# Patient Record
Sex: MALE | Race: WHITE | NOT HISPANIC OR LATINO | Employment: FULL TIME | URBAN - METROPOLITAN AREA
[De-identification: names, ages, dates, MRNs, and addresses within clinical notes are randomized per-mention and may not be internally consistent; named-entity substitution may affect disease eponyms.]

---

## 2021-02-10 ENCOUNTER — TELEPHONE (OUTPATIENT)
Dept: SCHEDULING | Facility: IMAGING CENTER | Age: 31
End: 2021-02-10

## 2021-03-05 ENCOUNTER — OFFICE VISIT (OUTPATIENT)
Dept: MEDICAL GROUP | Facility: MEDICAL CENTER | Age: 31
End: 2021-03-05
Payer: COMMERCIAL

## 2021-03-05 VITALS
HEIGHT: 72 IN | WEIGHT: 195.33 LBS | DIASTOLIC BLOOD PRESSURE: 80 MMHG | RESPIRATION RATE: 18 BRPM | SYSTOLIC BLOOD PRESSURE: 118 MMHG | BODY MASS INDEX: 26.46 KG/M2 | TEMPERATURE: 98.9 F | OXYGEN SATURATION: 95 % | HEART RATE: 81 BPM

## 2021-03-05 DIAGNOSIS — R00.2 PALPITATIONS: ICD-10-CM

## 2021-03-05 DIAGNOSIS — F41.8 SITUATIONAL ANXIETY: ICD-10-CM

## 2021-03-05 DIAGNOSIS — Z76.89 ENCOUNTER TO ESTABLISH CARE: ICD-10-CM

## 2021-03-05 PROBLEM — F51.04 CHRONIC INSOMNIA: Status: ACTIVE | Noted: 2021-03-05

## 2021-03-05 PROBLEM — F51.04 CHRONIC INSOMNIA: Status: RESOLVED | Noted: 2021-03-05 | Resolved: 2021-03-05

## 2021-03-05 PROCEDURE — 99203 OFFICE O/P NEW LOW 30 MIN: CPT | Performed by: NURSE PRACTITIONER

## 2021-03-05 RX ORDER — ALPRAZOLAM 0.5 MG/1
0.5 TABLET ORAL NIGHTLY PRN
COMMUNITY
End: 2021-03-05 | Stop reason: SDUPTHER

## 2021-03-05 RX ORDER — ALPRAZOLAM 0.5 MG/1
0.5 TABLET ORAL
Qty: 30 TABLET | Refills: 0 | Status: SHIPPED | OUTPATIENT
Start: 2021-03-05 | End: 2021-04-04

## 2021-03-05 ASSESSMENT — PATIENT HEALTH QUESTIONNAIRE - PHQ9: CLINICAL INTERPRETATION OF PHQ2 SCORE: 0

## 2021-03-05 NOTE — PROGRESS NOTES
"Subjective:     Chief Complaint   Patient presents with   • New Patient     refill meds      Amparo Kline is a 30 y.o. male here today to establish care and discuss below concerns.  He is , no children    Palpitations  Starting as teenager. Has had EKG and holter in the past with no arrhythmia, believes that it showed PVCs. Triggered by heavy carb meals and caffeine which he avoids.  No h/o syncope, dizziness, shortness of breath, chest pain, anemia    Situational anxiety  Patient reports history of occasional anxiety typically exacerbated by travel, airplane plane flights.  He is used alprazolam on very sparing occasions and is requesting refill today.  30 tablets generally last him several months.  He does not drive or drink alcohol with medication.  No history of substance abuse.   report reviewed, no prescription history available in [place].  Patient has recently relocated from [place]       Current medicines (including changes today)  Current Outpatient Medications   Medication Sig Dispense Refill   • ALPRAZolam (XANAX) 0.5 MG Tab Take 1 tablet by mouth 1 time a day as needed for Sleep for up to 30 days. 30 tablet 0     No current facility-administered medications for this visit.     He  has no past medical history on file.    ROS included above     Objective:     /80 (BP Location: Right arm, Patient Position: Sitting, BP Cuff Size: Adult)   Pulse 81   Temp 37.2 °C (98.9 °F) (Temporal)   Resp 18   Ht 1.816 m (5' 11.5\")   Wt 88.6 kg (195 lb 5.2 oz)   SpO2 95%  Body mass index is 26.86 kg/m².     Physical Exam:  General: Alert, oriented in no acute distress.  Eye contact is good, speech is normal, affect calm  HEENT:  TMs gray with good landmarks bilaterally. No lymphadenopathy.  Lungs: clear to auscultation bilaterally, normal effort, no wheeze/ rhonchi/ rales.  CV: regular rate and rhythm, S1, S2, no murmur  Abdomen: soft, nontender, no hepatosplenomegaly  Ext: no edema, color normal, " vascularity normal, temperature normal    Assessment and Plan:   The following treatment plan was discussed   1. Palpitations   longstanding history of palpitations triggered by caffeine or high carb meals which he avoids.  He has had prior evaluation including Holter study which showed no arrhythmia.  No history of chest pain, syncope, shortness of breath, anemia.  No treatment needed at this time   2. Situational anxiety   occasional episodes of anxiety typically triggered by airplane travel or long distance car rides.  He has managed with very sparing use of alprazolam, he does not drive or drink alcohol with this medication (wife typically drives).  I will provide renewal for him today and expect that this will last several months.  He will need appointment for any future refill  ALPRAZolam (XANAX) 0.5 MG Tab   3. Encounter to establish care         Followup: Annually, sooner as needed         Please note that this dictation was created using voice recognition software. I have worked with consultants from the vendor as well as technical experts from [facility] to optimize the interface. I have made every reasonable attempt to correct obvious errors, but I expect that there are errors of grammar and possibly content that I did not discover before finalizing the note.

## 2021-03-05 NOTE — ASSESSMENT & PLAN NOTE
Starting as teenager. Has had EKG and holter in the past with no arrhythmia, believes that it showed PVCs. Triggered by heavy carb meals and caffeine which he avoids.  No h/o syncope, dizziness, shortness of breath, chest pain, anemia

## 2021-03-05 NOTE — ASSESSMENT & PLAN NOTE
Patient reports history of occasional anxiety typically exacerbated by travel, airplane plane flights.  He is used alprazolam on very sparing occasions and is requesting refill today.  30 tablets generally last him several months.  He does not drive or drink alcohol with medication.  No history of substance abuse.   report reviewed, no prescription history available in [place].  Patient has recently relocated from [place]

## 2021-05-19 ENCOUNTER — HOSPITAL ENCOUNTER (EMERGENCY)
Facility: MEDICAL CENTER | Age: 31
End: 2021-05-19
Attending: EMERGENCY MEDICINE
Payer: COMMERCIAL

## 2021-05-19 VITALS
HEIGHT: 71 IN | OXYGEN SATURATION: 96 % | RESPIRATION RATE: 20 BRPM | SYSTOLIC BLOOD PRESSURE: 147 MMHG | HEART RATE: 79 BPM | DIASTOLIC BLOOD PRESSURE: 88 MMHG | BODY MASS INDEX: 26.2 KG/M2 | WEIGHT: 187.17 LBS | TEMPERATURE: 97.8 F

## 2021-05-19 DIAGNOSIS — R14.0 BLOATING: ICD-10-CM

## 2021-05-19 DIAGNOSIS — I49.1 PAC (PREMATURE ATRIAL CONTRACTION): ICD-10-CM

## 2021-05-19 LAB
ALBUMIN SERPL BCP-MCNC: 4.6 G/DL (ref 3.2–4.9)
ALBUMIN/GLOB SERPL: 1.5 G/DL
ALP SERPL-CCNC: 98 U/L (ref 30–99)
ALT SERPL-CCNC: 17 U/L (ref 2–50)
ANION GAP SERPL CALC-SCNC: 11 MMOL/L (ref 7–16)
APPEARANCE UR: CLEAR
AST SERPL-CCNC: 16 U/L (ref 12–45)
BASOPHILS # BLD AUTO: 0.4 % (ref 0–1.8)
BASOPHILS # BLD: 0.03 K/UL (ref 0–0.12)
BILIRUB SERPL-MCNC: 2.2 MG/DL (ref 0.1–1.5)
BILIRUB UR QL STRIP.AUTO: NEGATIVE
BUN SERPL-MCNC: 8 MG/DL (ref 8–22)
CALCIUM SERPL-MCNC: 9.9 MG/DL (ref 8.5–10.5)
CHLORIDE SERPL-SCNC: 105 MMOL/L (ref 96–112)
CO2 SERPL-SCNC: 24 MMOL/L (ref 20–33)
COLOR UR: YELLOW
CREAT SERPL-MCNC: 0.83 MG/DL (ref 0.5–1.4)
EKG IMPRESSION: NORMAL
EOSINOPHIL # BLD AUTO: 0.08 K/UL (ref 0–0.51)
EOSINOPHIL NFR BLD: 1.2 % (ref 0–6.9)
ERYTHROCYTE [DISTWIDTH] IN BLOOD BY AUTOMATED COUNT: 41.6 FL (ref 35.9–50)
GLOBULIN SER CALC-MCNC: 3.1 G/DL (ref 1.9–3.5)
GLUCOSE SERPL-MCNC: 93 MG/DL (ref 65–99)
GLUCOSE UR STRIP.AUTO-MCNC: NEGATIVE MG/DL
HCT VFR BLD AUTO: 48.9 % (ref 42–52)
HGB BLD-MCNC: 16.8 G/DL (ref 14–18)
IMM GRANULOCYTES # BLD AUTO: 0.02 K/UL (ref 0–0.11)
IMM GRANULOCYTES NFR BLD AUTO: 0.3 % (ref 0–0.9)
KETONES UR STRIP.AUTO-MCNC: NEGATIVE MG/DL
LEUKOCYTE ESTERASE UR QL STRIP.AUTO: NEGATIVE
LIPASE SERPL-CCNC: 32 U/L (ref 11–82)
LYMPHOCYTES # BLD AUTO: 1.07 K/UL (ref 1–4.8)
LYMPHOCYTES NFR BLD: 15.9 % (ref 22–41)
MCH RBC QN AUTO: 31.7 PG (ref 27–33)
MCHC RBC AUTO-ENTMCNC: 34.4 G/DL (ref 33.7–35.3)
MCV RBC AUTO: 92.3 FL (ref 81.4–97.8)
MICRO URNS: NORMAL
MONOCYTES # BLD AUTO: 0.52 K/UL (ref 0–0.85)
MONOCYTES NFR BLD AUTO: 7.7 % (ref 0–13.4)
NEUTROPHILS # BLD AUTO: 5 K/UL (ref 1.82–7.42)
NEUTROPHILS NFR BLD: 74.5 % (ref 44–72)
NITRITE UR QL STRIP.AUTO: NEGATIVE
NRBC # BLD AUTO: 0 K/UL
NRBC BLD-RTO: 0 /100 WBC
PH UR STRIP.AUTO: 6.5 [PH] (ref 5–8)
PLATELET # BLD AUTO: 238 K/UL (ref 164–446)
PMV BLD AUTO: 11.3 FL (ref 9–12.9)
POTASSIUM SERPL-SCNC: 3.9 MMOL/L (ref 3.6–5.5)
PROT SERPL-MCNC: 7.7 G/DL (ref 6–8.2)
PROT UR QL STRIP: NEGATIVE MG/DL
RBC # BLD AUTO: 5.3 M/UL (ref 4.7–6.1)
RBC UR QL AUTO: NEGATIVE
SODIUM SERPL-SCNC: 140 MMOL/L (ref 135–145)
SP GR UR STRIP.AUTO: 1
TROPONIN T SERPL-MCNC: 7 NG/L (ref 6–19)
UROBILINOGEN UR STRIP.AUTO-MCNC: 0.2 MG/DL
WBC # BLD AUTO: 6.7 K/UL (ref 4.8–10.8)

## 2021-05-19 PROCEDURE — 93005 ELECTROCARDIOGRAM TRACING: CPT | Performed by: EMERGENCY MEDICINE

## 2021-05-19 PROCEDURE — 81003 URINALYSIS AUTO W/O SCOPE: CPT

## 2021-05-19 PROCEDURE — 80053 COMPREHEN METABOLIC PANEL: CPT

## 2021-05-19 PROCEDURE — 83690 ASSAY OF LIPASE: CPT

## 2021-05-19 PROCEDURE — 84484 ASSAY OF TROPONIN QUANT: CPT

## 2021-05-19 PROCEDURE — 99284 EMERGENCY DEPT VISIT MOD MDM: CPT

## 2021-05-19 PROCEDURE — A9270 NON-COVERED ITEM OR SERVICE: HCPCS | Performed by: EMERGENCY MEDICINE

## 2021-05-19 PROCEDURE — 85025 COMPLETE CBC W/AUTO DIFF WBC: CPT

## 2021-05-19 PROCEDURE — 93005 ELECTROCARDIOGRAM TRACING: CPT

## 2021-05-19 PROCEDURE — 700102 HCHG RX REV CODE 250 W/ 637 OVERRIDE(OP): Performed by: EMERGENCY MEDICINE

## 2021-05-19 RX ADMIN — LIDOCAINE HYDROCHLORIDE 30 ML: 20 SOLUTION OROPHARYNGEAL at 09:04

## 2021-05-19 NOTE — ED TRIAGE NOTES
"Chief Complaint   Patient presents with   • Palpitations     pt states heart palpitationsx1 hour. pt states usually they go away after 10 minutes. pt denies SOB   • Abdominal Pain     pt states LUQ started 1 hour ago when the palpitations started. pt denies any N/V       Pt walk in this morning for above complaint. Pt aox4, GCS 15. Pt has hx of gastroenteritis and takes prilosec PRN. EKG and protocol ordered. Educated pt on triage process and to notify if there is any change      /96   Pulse 80   Temp 36.2 °C (97.2 °F) (Temporal)   Resp 16   Ht 1.803 m (5' 11\")   Wt 84.9 kg (187 lb 2.7 oz)   SpO2 98%   BMI 26.11 kg/m²     "

## 2021-05-19 NOTE — ED PROVIDER NOTES
ED Provider Note    CHIEF COMPLAINT  Chief Complaint   Patient presents with   • Palpitations     pt states heart palpitationsx1 hour. pt states usually they go away after 10 minutes. pt denies SOB   • Abdominal Pain     pt states LUQ started 1 hour ago when the palpitations started. pt denies any N/V       HPI  Amparo Kline is a 30 y.o. male who presents to the emergency room with chief complaint of palpitations. He describes that this happens to him on a fairly regular basis, but usually episodes don't last this long. He tends to have palpitations after he develops abdominal bloating. He just feels like his stomach is filled and has some discomfort in the left upper abdomen. In fact he pushes on his left upper quadrant and you can hear fluid movement. He has not had any nausea or vomiting. He is had normal bowel movements. Because the palpitations did not go away like they normally do he presents to the ER. He has not had any dizziness or lightheadedness. No chest pain, shortness of breath, pleuritic symptoms. No leg swelling or leg pain. Denies alcohol use. No marijuana products. No stimulants. No caffeine.    REVIEW OF SYSTEMS  As per HPI, otherwise a 10 point review of systems is negative    PAST MEDICAL HISTORY  palpitations    SOCIAL HISTORY  Social History     Tobacco Use   • Smoking status: Light Tobacco Smoker   • Smokeless tobacco: Never Used   • Tobacco comment: occ cigar   Substance Use Topics   • Alcohol use: Yes     Comment: 1-2 drinks on weekends   • Drug use: Never       SURGICAL HISTORY  History reviewed. No pertinent surgical history.    CURRENT MEDICATIONS  Home Medications     Reviewed by Lakeisha Gresham R.N. (Registered Nurse) on 05/19/21 at 0718  Med List Status: Complete   Medication Last Dose Status        Patient Odell Taking any Medications                       ALLERGIES  No Known Allergies    PHYSICAL EXAM  VITAL SIGNS: /83   Pulse 82   Temp 36.2 °C (97.2 °F) (Temporal)   Resp 20   " Ht 1.803 m (5' 11\")   Wt 84.9 kg (187 lb 2.7 oz)   SpO2 92%   BMI 26.11 kg/m²    Constitutional: Awake and alert  HENT: Normal inspection  Eyes: Normal inspection  Neck: Grossly normal range of motion.  Cardiovascular: Normal heart rate, Normal rhythm.  Symmetric peripheral pulses.   Thorax & Lungs: No respiratory distress, No wheezing, No rales, No rhonchi, No chest tenderness.   Abdomen: Bowel sounds normal, soft, non-distended, nontender, no mass  Skin: No obvious rash.  Back: No tenderness, No CVA tenderness.   Extremities: No clubbing, cyanosis, edema, no Homans or cords.  Neurologic: Grossly normal   Psychiatric: Normal for situation      Labs:  Results for orders placed or performed during the hospital encounter of 05/19/21   CBC WITH DIFFERENTIAL   Result Value Ref Range    WBC 6.7 4.8 - 10.8 K/uL    RBC 5.30 4.70 - 6.10 M/uL    Hemoglobin 16.8 14.0 - 18.0 g/dL    Hematocrit 48.9 42.0 - 52.0 %    MCV 92.3 81.4 - 97.8 fL    MCH 31.7 27.0 - 33.0 pg    MCHC 34.4 33.7 - 35.3 g/dL    RDW 41.6 35.9 - 50.0 fL    Platelet Count 238 164 - 446 K/uL    MPV 11.3 9.0 - 12.9 fL    Neutrophils-Polys 74.50 (H) 44.00 - 72.00 %    Lymphocytes 15.90 (L) 22.00 - 41.00 %    Monocytes 7.70 0.00 - 13.40 %    Eosinophils 1.20 0.00 - 6.90 %    Basophils 0.40 0.00 - 1.80 %    Immature Granulocytes 0.30 0.00 - 0.90 %    Nucleated RBC 0.00 /100 WBC    Neutrophils (Absolute) 5.00 1.82 - 7.42 K/uL    Lymphs (Absolute) 1.07 1.00 - 4.80 K/uL    Monos (Absolute) 0.52 0.00 - 0.85 K/uL    Eos (Absolute) 0.08 0.00 - 0.51 K/uL    Baso (Absolute) 0.03 0.00 - 0.12 K/uL    Immature Granulocytes (abs) 0.02 0.00 - 0.11 K/uL    NRBC (Absolute) 0.00 K/uL   COMP METABOLIC PANEL   Result Value Ref Range    Sodium 140 135 - 145 mmol/L    Potassium 3.9 3.6 - 5.5 mmol/L    Chloride 105 96 - 112 mmol/L    Co2 24 20 - 33 mmol/L    Anion Gap 11.0 7.0 - 16.0    Glucose 93 65 - 99 mg/dL    Bun 8 8 - 22 mg/dL    Creatinine 0.83 0.50 - 1.40 mg/dL    Calcium " 9.9 8.5 - 10.5 mg/dL    AST(SGOT) 16 12 - 45 U/L    ALT(SGPT) 17 2 - 50 U/L    Alkaline Phosphatase 98 30 - 99 U/L    Total Bilirubin 2.2 (H) 0.1 - 1.5 mg/dL    Albumin 4.6 3.2 - 4.9 g/dL    Total Protein 7.7 6.0 - 8.2 g/dL    Globulin 3.1 1.9 - 3.5 g/dL    A-G Ratio 1.5 g/dL   LIPASE   Result Value Ref Range    Lipase 32 11 - 82 U/L   URINALYSIS    Specimen: Urine, Clean Catch   Result Value Ref Range    Color Yellow     Character Clear     Specific Gravity 1.004 <1.035    Ph 6.5 5.0 - 8.0    Glucose Negative Negative mg/dL    Ketones Negative Negative mg/dL    Protein Negative Negative mg/dL    Bilirubin Negative Negative    Urobilinogen, Urine 0.2 Negative    Nitrite Negative Negative    Leukocyte Esterase Negative Negative    Occult Blood Negative Negative    Micro Urine Req see below    ESTIMATED GFR   Result Value Ref Range    GFR If African American >60 >60 mL/min/1.73 m 2    GFR If Non African American >60 >60 mL/min/1.73 m 2   TROPONIN   Result Value Ref Range    Troponin T 7 6 - 19 ng/L   EKG   Result Value Ref Range    Report       [facility] Emergency Dept.    Test Date:  2021  Pt Name:    FRANCK ONEILL                    Department: ER  MRN:        [id number]                      Room:  Gender:     Male                         Technician: JORDANA  :        1990                   Requested By:ER TRIAGE PROTOCOL  Order #:    [id number]                    Reading MD: KIERSTEN LANTIGUA MD    Measurements  Intervals                                Axis  Rate:       96                           P:          33  NM:         174                          QRS:        27  QRSD:       97                           T:          27  QT:         336  QTc:        423    Interpretive Statements  Sinus rhythm  Atrial premature complexes  No previous ECG available for comparison  Electronically Signed On 2021 10:06:00 PDT by KIERSTEN LANTIGUA MD         Medications   hyoscyamine-maalox plus-lidocaine  viscous (GI COCKTAIL) oral susp 30 mL (30 mL Oral Given 5/19/21 0904)       COURSE & MEDICAL DECISION MAKING  patient presents with palpitations. He has premature atrial contractions. I explained to the patient that these were benign. Interestingly he has these associated with stomach distention and he feels bloated. I obtained laboratory data and the patient was given a G.I. cocktail.    No significant change in symptoms with G.I. cocktail.    Laboratory data returns showing neutral predominance which is nonspecific. Bilirubin is 2.2, but otherwise LFTs are normal. Troponin is normal. Electrolytes normal.    On further interview with the patient he reports that generally when the bloating occur he drinks impinges he has a bowel movement and his symptoms resolved. He states he will proceed with this. I advised that he take a daily antacid. He may benefit from gastric emptying study or perhaps upper endoscopy since the symptoms recur. Consider G.I. referral if persistent palpitation/PACs. He will follow-up with his primary doctor for now. I precaution him to return to the ER for Demario abdominal pain, pain in the right lower right upper quadrant, fevers or concern.    FINAL IMPRESSION  1. Premature atrial contractions.  2. Abdominal bloating, unspecified      This dictation was created using voice recognition software. The accuracy of the dictation is limited to the abilities of the software.  The nursing notes were reviewed and certain aspects of this information were incorporated into this note.      Electronically signed by: Star Mclain M.D., 5/19/2021 10:04 AM

## 2021-08-16 ENCOUNTER — OFFICE VISIT (OUTPATIENT)
Dept: MEDICAL GROUP | Facility: MEDICAL CENTER | Age: 31
End: 2021-08-16
Payer: COMMERCIAL

## 2021-08-16 VITALS
HEIGHT: 71 IN | WEIGHT: 189.6 LBS | BODY MASS INDEX: 26.54 KG/M2 | DIASTOLIC BLOOD PRESSURE: 70 MMHG | RESPIRATION RATE: 14 BRPM | TEMPERATURE: 97.1 F | SYSTOLIC BLOOD PRESSURE: 98 MMHG | OXYGEN SATURATION: 98 % | HEART RATE: 87 BPM

## 2021-08-16 DIAGNOSIS — F41.8 SITUATIONAL ANXIETY: ICD-10-CM

## 2021-08-16 DIAGNOSIS — Z00.00 ANNUAL PHYSICAL EXAM: ICD-10-CM

## 2021-08-16 PROCEDURE — 99395 PREV VISIT EST AGE 18-39: CPT | Performed by: NURSE PRACTITIONER

## 2021-08-16 RX ORDER — ALPRAZOLAM 0.5 MG/1
0.5 TABLET ORAL
COMMUNITY
End: 2021-08-16 | Stop reason: SDUPTHER

## 2021-08-16 RX ORDER — ALPRAZOLAM 0.5 MG/1
0.5 TABLET ORAL
Qty: 30 TABLET | Refills: 0 | Status: SHIPPED | OUTPATIENT
Start: 2021-08-16 | End: 2021-09-15

## 2021-08-16 ASSESSMENT — FIBROSIS 4 INDEX: FIB4 SCORE: 0.47

## 2021-08-16 NOTE — PROGRESS NOTES
"Subjective:     Chief Complaint   Patient presents with   • Annual Exam   • Medication Refill     Xanax     Amparo Kline is a 30 y.o. male here for annual and medication refill    Situational anxiety  Traveling frequently for work, tends to be very anxious with airplane travel.  Using alprazolam as needed, requesting refill today.   report reviewed.  She does not drive or drink alcohol with medication       Current medicines (including changes today)  Current Outpatient Medications   Medication Sig Dispense Refill   • ALPRAZolam (XANAX) 0.5 MG Tab Take 0.5 mg by mouth 1 time a day as needed for Sleep.       No current facility-administered medications for this visit.     He  has no past medical history on file.    ROS included above     Objective:     BP (!) 98/70 (BP Location: Left arm, Patient Position: Sitting, BP Cuff Size: Adult)   Pulse 87   Temp 36.2 °C (97.1 °F) (Temporal)   Resp 14   Ht 1.81 m (5' 11.26\")   Wt 86 kg (189 lb 9.5 oz)   SpO2 98%  Body mass index is 26.25 kg/m².     Physical Exam:  General: Alert, oriented in no acute distress.  Eye contact is good, speech is normal, affect calm  HEENT:  TMs gray with good landmarks bilaterally. No lymphadenopathy.  Lungs: clear to auscultation bilaterally, normal effort, no wheeze/ rhonchi/ rales.  CV: regular rate and rhythm, S1, S2, no murmur  Abdomen: soft, nontender  Ext: no edema, color normal, vascularity normal, temperature normal    Assessment and Plan:   The following treatment plan was discussed  1. Annual physical exam  Normal physical exam. General health and wellness discussion including healthy diet, regular exercise. Advised regular dental cleanings, eye exam yearly.  COVID-19 vaccine recommended     2. Situational anxiety   occasionally using alprazolam for airplane travel, medication refill provided today.   report reviewed, I determined this prescription to be medically necessary.  Risks discussed       Followup: annually     "     Please note that this dictation was created using voice recognition software. I have worked with consultants from the vendor as well as technical experts from [facility] to optimize the interface. I have made every reasonable attempt to correct obvious errors, but I expect that there are errors of grammar and possibly content that I did not discover before finalizing the note.

## 2021-08-16 NOTE — ASSESSMENT & PLAN NOTE
Traveling frequently for work, tends to be very anxious with airplane travel.  Using alprazolam as needed, requesting refill today.   report reviewed.  She does not drive or drink alcohol with medication

## 2021-12-03 ENCOUNTER — OFFICE VISIT (OUTPATIENT)
Dept: MEDICAL GROUP | Facility: MEDICAL CENTER | Age: 31
End: 2021-12-03
Payer: COMMERCIAL

## 2021-12-03 VITALS
DIASTOLIC BLOOD PRESSURE: 82 MMHG | HEIGHT: 71 IN | HEART RATE: 75 BPM | SYSTOLIC BLOOD PRESSURE: 114 MMHG | OXYGEN SATURATION: 97 % | BODY MASS INDEX: 27.93 KG/M2 | RESPIRATION RATE: 19 BRPM | TEMPERATURE: 98.2 F | WEIGHT: 199.52 LBS

## 2021-12-03 DIAGNOSIS — Z76.0 MEDICATION REFILL: ICD-10-CM

## 2021-12-03 DIAGNOSIS — F41.8 SITUATIONAL ANXIETY: ICD-10-CM

## 2021-12-03 PROCEDURE — 99213 OFFICE O/P EST LOW 20 MIN: CPT | Performed by: NURSE PRACTITIONER

## 2021-12-03 RX ORDER — ALPRAZOLAM 0.5 MG/1
0.5 TABLET ORAL 3 TIMES DAILY PRN
Qty: 30 TABLET | Refills: 0 | Status: SHIPPED | OUTPATIENT
Start: 2021-12-03 | End: 2022-01-02

## 2021-12-03 ASSESSMENT — FIBROSIS 4 INDEX: FIB4 SCORE: 0.49

## 2021-12-03 NOTE — ASSESSMENT & PLAN NOTE
Continues to have anxiety with travel or being away from home.  Travels frequently for work, requesting refill of alprazolam which she has used on sparing occasions with good results.  He does not drive or drink alcohol with medication.   report reviewed, no aberrant behavior, no opiate use.  Does not feel that he has trouble with anxiety on a daily basis

## 2022-02-07 ENCOUNTER — OFFICE VISIT (OUTPATIENT)
Dept: MEDICAL GROUP | Facility: MEDICAL CENTER | Age: 32
End: 2022-02-07
Payer: COMMERCIAL

## 2022-02-07 VITALS
OXYGEN SATURATION: 96 % | HEART RATE: 73 BPM | SYSTOLIC BLOOD PRESSURE: 120 MMHG | WEIGHT: 204.15 LBS | RESPIRATION RATE: 18 BRPM | BODY MASS INDEX: 27.65 KG/M2 | HEIGHT: 72 IN | TEMPERATURE: 97.6 F | DIASTOLIC BLOOD PRESSURE: 82 MMHG

## 2022-02-07 DIAGNOSIS — F41.8 SITUATIONAL ANXIETY: ICD-10-CM

## 2022-02-07 DIAGNOSIS — Z79.899 CHRONIC USE OF BENZODIAZEPINE FOR THERAPEUTIC PURPOSE: ICD-10-CM

## 2022-02-07 PROCEDURE — 99214 OFFICE O/P EST MOD 30 MIN: CPT | Performed by: INTERNAL MEDICINE

## 2022-02-07 RX ORDER — ALPRAZOLAM 0.5 MG/1
0.5 TABLET ORAL NIGHTLY PRN
COMMUNITY
End: 2022-02-07

## 2022-02-07 RX ORDER — ALPRAZOLAM 0.5 MG/1
0.5 TABLET ORAL
Qty: 30 TABLET | Refills: 0 | Status: SHIPPED | OUTPATIENT
Start: 2022-02-07 | End: 2022-05-08

## 2022-02-07 ASSESSMENT — FIBROSIS 4 INDEX: FIB4 SCORE: 0.49

## 2022-02-07 ASSESSMENT — ANXIETY QUESTIONNAIRES
7. FEELING AFRAID AS IF SOMETHING AWFUL MIGHT HAPPEN: NOT AT ALL
GAD7 TOTAL SCORE: 0
5. BEING SO RESTLESS THAT IT IS HARD TO SIT STILL: NOT AT ALL
6. BECOMING EASILY ANNOYED OR IRRITABLE: NOT AT ALL
4. TROUBLE RELAXING: NOT AT ALL
1. FEELING NERVOUS, ANXIOUS, OR ON EDGE: NOT AT ALL
3. WORRYING TOO MUCH ABOUT DIFFERENT THINGS: NOT AT ALL
2. NOT BEING ABLE TO STOP OR CONTROL WORRYING: NOT AT ALL

## 2022-02-07 ASSESSMENT — ENCOUNTER SYMPTOMS
CHILLS: 0
COUGH: 0
FEVER: 0
PALPITATIONS: 0
HEARTBURN: 1
NAUSEA: 0
SORE THROAT: 0
DEPRESSION: 0
HEMOPTYSIS: 0

## 2022-02-07 ASSESSMENT — LIFESTYLE VARIABLES: SUBSTANCE_ABUSE: 0

## 2022-02-07 ASSESSMENT — PATIENT HEALTH QUESTIONNAIRE - PHQ9: CLINICAL INTERPRETATION OF PHQ2 SCORE: 0

## 2022-02-07 NOTE — ASSESSMENT & PLAN NOTE
Continue alprazolam 0.5 mg tablet as needed: Patient uses on average approximately 10 tablets/months.  Controlled substance agreement signed today, urine drug screen obtained today.  Controlled substance discussed with client. Client agrees to abide by controlled substance contract.

## 2022-02-07 NOTE — LETTER
[facility]  Romy Lzoa M.D.  [address]  [place] [address]  Fax: [phone]   Authorization for Release/Disclosure of   Protected Health Information   Name: FRANCK ONEILL : 1990 SSN: [id number]   Address: [address] [place] [address]  [place] [address] Phone:    [phone] (home)    I authorize the entity listed below to release/disclose the PHI below to:   [facility]/Romy Loza M.D. and Romy Loza M.D.   Provider or Entity Name:  Nicoel Chi MD   Address   [facility], [facility], [facility]  [address] [place] [address] Phone:      Fax:     Reason for request: continuity of care   Information to be released:    [  ] LAST COLONOSCOPY,  including any PATH REPORT and follow-up  [  ] LAST FIT/COLOGUARD RESULT [  ] LAST DEXA  [  ] LAST MAMMOGRAM  [  ] LAST PAP  [  ] LAST LABS [  ] RETINA EXAM REPORT  [  ] IMMUNIZATION RECORDS  [  ] Release all info      [  ] Check here and initial the line next to each item to release ALL health information INCLUDING  _____ Care and treatment for drug and / or alcohol abuse  _____ HIV testing, infection status, or AIDS  _____ Genetic Testing    DATES OF SERVICE OR TIME PERIOD TO BE DISCLOSED: _____________  I understand and acknowledge that:  * This Authorization may be revoked at any time by you in writing, except if your health information has already been used or disclosed.  * Your health information that will be used or disclosed as a result of you signing this authorization could be re-disclosed by the recipient. If this occurs, your re-disclosed health information may no longer be protected by State or Federal laws.  * You may refuse to sign this Authorization. Your refusal will not affect your ability to obtain treatment.  * This Authorization becomes effective upon signing and will  on (date) __________.      If no date is indicated, this Authorization will  one (1) year from the signature date.     Name: Amparo Eliud    Signature:   Date:     2/7/2022       PLEASE FAX REQUESTED RECORDS BACK TO: [phone]

## 2022-02-07 NOTE — PROGRESS NOTES
Subjective:     Chief Complaint   Patient presents with   • Establish Care   • Medication Refill      Diagnoses of Chronic use of benzodiazepine for therapeutic purpose and Situational anxiety were pertinent to this visit.    HISTORY OF THE PRESENT ILLNESS: Patient is a 31 y.o. male. This pleasant patient is here today to establish care and for Xanax refill. His prior PCP was prior [place] - Nicole Chi MD at  [place], [place]     Problem   Chronic Use of Benzodiazepine for Therapeutic Purpose    Controlled substance medication f/u:     The patient is currently taking controlled substance(s) for the following condition(s): Situational anxiety    The patient is taking the medication(s) as prescribed.     Current medication regimen:  - Medication name: Alprazolam  - Medication dose: 0.5 mg  - Medication frequency: Daily as needed  - Patient last took the above medication: more than 3 days ago.   - Date of last refill: 12/03/2021 for #30 tabs for  30 day supply. Refills in recent history (last 6 months) only filled by prescribers at our office, filled at the same pharmacy under the same insurance coverage.     The patient reports their symptoms are well controlled on their current medication regimen.  Adverse effects: none.    [place] PDMP checked today: yes  Patient has a controlled substances contract verified within the last 1 year:yes  Patient last had drug of abuse screening on the following date 02/07/2022 - pending   Patient last had confirmatory urine testing on the following date 02/07/2022  Patient has had a relevant physical exam pertaining to the above diagnosis in the last 6 months.Yes      Patient is a candidate for naloxone: .no This is considered in patients with history of overdose, history of substance abuse, concurrent opioid/benzodiazepine use, and/or >50MME/day.      We reviewed the risks/benefits of being on the above controlled substance(s). While taking this medication, patient will comply  with all rules as stipulated in the contract and follow up in the office at least every 3 months according to our office policy.       Situational Anxiety    This is a chronic problem, patient experiences bouts of anxiety while flying, he has to fly frequently for work.  He denies using illicit drugs, he never drinks and takes Xanax together.  PHQ-9 and SUSAN scores both are 0 today.  Patient denies suicidal thoughts or ideations.       Past Medical History:   Diagnosis Date   • Anxiety    • GERD (gastroesophageal reflux disease)     occasional     Past Surgical History:   Procedure Laterality Date   • ENDOSCOPY,UPPER GI SIMPLE PRMY      5 years ago     Family History   Problem Relation Age of Onset   • Hypertension Mother    • Cancer Mother         breast   • Diabetes Father    • Kidney Disease Father         dialysis, ESRD   • Hypertension Father    • Hyperlipidemia Father    • Cancer Maternal Aunt         breast     Social History     Tobacco Use   • Smoking status: Light Tobacco Smoker   • Smokeless tobacco: Never Used   • Tobacco comment: occ cigar   Vaping Use   • Vaping Use: Never used   Substance Use Topics   • Alcohol use: Yes     Comment: 3-5 a week    • Drug use: Never     Current Outpatient Medications Ordered in Epic   Medication Sig Dispense Refill   • ALPRAZolam (XANAX) 0.5 MG Tab Take 0.5 mg by mouth at bedtime as needed for Sleep.     • ALPRAZolam (XANAX) 0.5 MG Tab Take 1 Tablet by mouth 1 time a day as needed for Anxiety (situational anxiety) for up to 90 days. 30 Tablet 0     No current Epic-ordered facility-administered medications on file.     Health Maintenance: Vaccination records requested from PCP.    Review of Systems   Constitutional: Negative for chills and fever.   HENT: Negative for sore throat.    Respiratory: Negative for cough and hemoptysis.    Cardiovascular: Negative for chest pain and palpitations.   Gastrointestinal: Positive for heartburn (rare). Negative for nausea.  "  Genitourinary: Negative for dysuria.   Psychiatric/Behavioral: Negative for depression, substance abuse and suicidal ideas.     Objective:     Exam: /82 (BP Location: Left arm, Patient Position: Sitting, BP Cuff Size: Adult)   Pulse 73   Temp 36.4 °C (97.6 °F) (Temporal)   Resp 18   Ht 1.82 m (5' 11.65\")   Wt 92.6 kg (204 lb 2.3 oz)   SpO2 96%  Body mass index is 27.96 kg/m².    Physical Exam  Constitutional:       Appearance: Normal appearance.   HENT:      Head: Normocephalic and atraumatic.      Nose: Nose normal. No congestion.      Mouth/Throat:      Mouth: Mucous membranes are moist.      Pharynx: Oropharynx is clear. No oropharyngeal exudate.   Eyes:      General: No scleral icterus.  Cardiovascular:      Rate and Rhythm: Normal rate and regular rhythm.      Pulses: Normal pulses.      Heart sounds: Normal heart sounds. No murmur heard.  No gallop.    Pulmonary:      Effort: Pulmonary effort is normal. No respiratory distress.      Breath sounds: Normal breath sounds. No wheezing or rales.   Skin:     General: Skin is warm and dry.   Neurological:      General: No focal deficit present.      Mental Status: He is alert and oriented to person, place, and time.      Gait: Gait normal.   Psychiatric:         Mood and Affect: Mood normal.         Behavior: Behavior normal.         Thought Content: Thought content normal.         Judgment: Judgment normal.         Labs: Reviewed CBC, CMP, UA and troponin from May 19, 2021.    Assessment & Plan:   31 y.o. male with the following -    Problem List Items Addressed This Visit     Chronic use of benzodiazepine for therapeutic purpose     Continue alprazolam 0.5 mg tablet as needed: Patient uses on average approximately 10 tablets/months.  Controlled substance agreement signed today, urine drug screen obtained today.  Controlled substance discussed with client. Client agrees to abide by controlled substance contract.           Relevant Medications    " ALPRAZolam (XANAX) 0.5 MG Tab    Other Relevant Orders    Controlled Substance Treatment Agreement    Situational anxiety     This is a chronic problem, well-controlled with as needed Xanax.  PHQ-9 and SUSAN-7 scales almost 0 today.  Patient has no suicidal thoughts or ideations.         Relevant Medications    ALPRAZolam (XANAX) 0.5 MG Tab    ALPRAZolam (XANAX) 0.5 MG Tab          Return in about 3 months (around 5/7/2022) for med refill.    Please note that this dictation was created using voice recognition software. I have made every reasonable attempt to correct obvious errors, but I expect that there are errors of grammar and possibly content that I did not discover before finalizing the note.

## 2022-02-07 NOTE — ASSESSMENT & PLAN NOTE
This is a chronic problem, well-controlled with as needed Xanax.  PHQ-9 and SUSAN-7 scales almost 0 today.  Patient has no suicidal thoughts or ideations.

## 2022-04-14 NOTE — PROGRESS NOTES
"Subjective:     Chief Complaint   Patient presents with   • Medication Refill     ALPRAZolam (XANAX) 0.5 MG Tab     Amparo Kline is a 31 y.o. male here today to follow up on:    Situational anxiety  Continues to have anxiety with travel or being away from home.  Travels frequently for work, requesting refill of alprazolam which she has used on sparing occasions with good results.  He does not drive or drink alcohol with medication.   report reviewed, no aberrant behavior, no opiate use.  Does not feel that he has trouble with anxiety on a daily basis       Current medicines (including changes today)  Current Outpatient Medications   Medication Sig Dispense Refill   • ALPRAZolam (XANAX) 0.5 MG Tab Take 1 Tablet by mouth 3 times a day as needed for Anxiety for up to 30 days. 30 Tablet 0     No current facility-administered medications for this visit.     He  has no past medical history on file.    ROS included above     Objective:     /82 (BP Location: Left arm, Patient Position: Sitting, BP Cuff Size: Adult)   Pulse 75   Temp 36.8 °C (98.2 °F) (Temporal)   Resp 19   Ht 1.803 m (5' 11\")   Wt 90.5 kg (199 lb 8.3 oz)   SpO2 97%  Body mass index is 27.83 kg/m².     Physical Exam:  General: Alert, oriented in no acute distress.  Eye contact is good, speech is normal, affect calm.  Lungs: clear to auscultation bilaterally, normal effort, no wheeze/ rhonchi/ rales.  CV: regular rate and rhythm, S1, S2, no murmur  Ext: no edema, color normal, vascularity normal, temperature normal    Assessment and Plan:   The following treatment plan was discussed   1. Situational anxiety   anxiety primarily surrounding travel which she is required to do for work.  Additionally he has several trips coming up over the holidays.  I will renew alprazolam for occasional use.  No history of aberrant behavior.   report reviewed, risks discussed.  He is aware he should not drive or drink alcohol with medication  ALPRAZolam (XANAX) " 0.5 MG Tab   2. Medication refill         Followup: As needed         Please note that this dictation was created using voice recognition software. I have worked with consultants from the vendor as well as technical experts from [employer] to optimize the interface. I have made every reasonable attempt to correct obvious errors, but I expect that there are errors of grammar and possibly content that I did not discover before finalizing the note.        Minoxidil Pregnancy And Lactation Text: This medication has not been assigned a Pregnancy Risk Category but animal studies failed to show danger with the topical medication. It is unknown if the medication is excreted in breast milk.

## 2022-05-09 ENCOUNTER — APPOINTMENT (OUTPATIENT)
Dept: MEDICAL GROUP | Facility: MEDICAL CENTER | Age: 32
End: 2022-05-09
Payer: COMMERCIAL

## 2022-05-12 ENCOUNTER — OFFICE VISIT (OUTPATIENT)
Dept: MEDICAL GROUP | Facility: MEDICAL CENTER | Age: 32
End: 2022-05-12
Payer: COMMERCIAL

## 2022-05-12 VITALS
OXYGEN SATURATION: 95 % | HEART RATE: 77 BPM | BODY MASS INDEX: 29.57 KG/M2 | TEMPERATURE: 98.1 F | HEIGHT: 71 IN | SYSTOLIC BLOOD PRESSURE: 128 MMHG | WEIGHT: 211.2 LBS | DIASTOLIC BLOOD PRESSURE: 82 MMHG

## 2022-05-12 DIAGNOSIS — Z79.899 CHRONIC USE OF BENZODIAZEPINE FOR THERAPEUTIC PURPOSE: ICD-10-CM

## 2022-05-12 DIAGNOSIS — Z00.00 PREVENTATIVE HEALTH CARE: ICD-10-CM

## 2022-05-12 DIAGNOSIS — F41.8 SITUATIONAL ANXIETY: ICD-10-CM

## 2022-05-12 DIAGNOSIS — Z13.220 LIPID SCREENING: ICD-10-CM

## 2022-05-12 PROCEDURE — 99214 OFFICE O/P EST MOD 30 MIN: CPT | Performed by: INTERNAL MEDICINE

## 2022-05-12 RX ORDER — ALPRAZOLAM 0.5 MG/1
0.5 TABLET ORAL
Qty: 30 TABLET | Refills: 0 | Status: SHIPPED | OUTPATIENT
Start: 2022-05-12 | End: 2022-06-11

## 2022-05-12 ASSESSMENT — ENCOUNTER SYMPTOMS
CONSTITUTIONAL NEGATIVE: 1
CARDIOVASCULAR NEGATIVE: 1
RESPIRATORY NEGATIVE: 1
EYES NEGATIVE: 1
DEPRESSION: 0
GASTROINTESTINAL NEGATIVE: 1

## 2022-05-12 ASSESSMENT — LIFESTYLE VARIABLES: SUBSTANCE_ABUSE: 0

## 2022-05-12 ASSESSMENT — FIBROSIS 4 INDEX: FIB4 SCORE: 0.49

## 2022-05-13 NOTE — PROGRESS NOTES
Subjective:     Chief Complaint   Patient presents with   • Medication Refill   • Follow-Up     Diagnoses of Lipid screening, Preventative health care, Chronic use of benzodiazepine for therapeutic purpose, and Situational anxiety were pertinent to this visit.    HPI: Amparo is a pleasant 31 y.o. male who presents today for medication follow-up.    Problem   Situational Anxiety    This is a chronic problem, patient experiences bouts of anxiety while flying and traveling, he has to fly frequently for work.  He denies using illicit drugs, he never drinks and takes Xanax together.  He does not drive while taking Xanax.  PHQ-9 and SUSAN scores both are 0 today.  Patient denies suicidal thoughts or ideations.  PDMP reviewed today, last filled on February 9, 2022, #30 tablets.  30 tablets usually last patient up to 3 months.  Repeat pain screening obtained today, his previous was positive for alcohol.         Past Medical History:   Diagnosis Date   • Anxiety    • GERD (gastroesophageal reflux disease)     occasional     Past Surgical History:   Procedure Laterality Date   • ENDOSCOPY,UPPER GI SIMPLE PRMY      5 years ago     Family History   Problem Relation Age of Onset   • Hypertension Mother    • Cancer Mother         breast   • Diabetes Father    • Kidney Disease Father         dialysis, ESRD   • Hypertension Father    • Hyperlipidemia Father    • Cancer Maternal Aunt         breast     Social History     Tobacco Use   • Smoking status: Light Tobacco Smoker   • Smokeless tobacco: Never Used   • Tobacco comment: occ cigar   Vaping Use   • Vaping Use: Never used   Substance Use Topics   • Alcohol use: Yes     Comment: 3-5 a week    • Drug use: Never       Current Outpatient Medications Ordered in Epic   Medication Sig Dispense Refill   • ALPRAZolam (XANAX) 0.5 MG Tab Take 1 Tablet by mouth 1 time a day as needed for Anxiety for up to 30 days. 30 Tablet 0     No current Epic-ordered facility-administered medications on  "file.     Health Maintenance: patient will obtain his immunization records from his previous physician and upload to my chart.    Review of Systems   Constitutional: Negative.    HENT: Negative.    Eyes: Negative.    Respiratory: Negative.    Cardiovascular: Negative.    Gastrointestinal: Negative.    Skin: Negative.    Psychiatric/Behavioral: Negative for depression and substance abuse.     Objective:     Exam:  /82 (BP Location: Right arm, Patient Position: Sitting, BP Cuff Size: Adult)   Pulse 77   Temp 36.7 °C (98.1 °F) (Temporal)   Ht 1.803 m (5' 11\")   Wt 95.8 kg (211 lb 3.2 oz)   SpO2 95%   BMI 29.46 kg/m²  Body mass index is 29.46 kg/m².    Physical Exam  Constitutional:       Appearance: Normal appearance. He is normal weight.   HENT:      Head: Normocephalic and atraumatic.      Nose: Nose normal.      Mouth/Throat:      Mouth: Mucous membranes are moist.      Pharynx: Oropharynx is clear.   Eyes:      General: No scleral icterus.  Cardiovascular:      Rate and Rhythm: Normal rate and regular rhythm.      Pulses: Normal pulses.      Heart sounds: Normal heart sounds. No murmur heard.  Pulmonary:      Effort: Pulmonary effort is normal. No respiratory distress.      Breath sounds: Normal breath sounds. No wheezing or rales.   Skin:     General: Skin is warm and dry.   Neurological:      General: No focal deficit present.      Mental Status: He is alert and oriented to person, place, and time.   Psychiatric:         Mood and Affect: Mood normal.         Behavior: Behavior normal.         Thought Content: Thought content normal.         Judgment: Judgment normal.       Labs: reviewed pain management screening from 2/9/2022      Assessment & Plan:   Amparo  is a pleasant 31 y.o. male with the following -     Problem List Items Addressed This Visit     Chronic use of benzodiazepine for therapeutic purpose    Relevant Medications    ALPRAZolam (XANAX) 0.5 MG Tab    Other Relevant Orders    Pain " Management Screen    Situational anxiety     This is a chronic problem, controlled with Xanax 0.5 mg tablet as needed while flying or traveling for work.  Patient does not take Xanax and drive, does not mix alcohol with Xanax.  He has no suicidal thoughts or ideations.  PDMP reviewed, last filled in February 9, 2022, 30 tablets.  Patient does not take medication daily, 30 tablets generally last him about 3 months.  Patient is aware he needs in person appointment for further refills.  Repeat pain screening obtained today.           Relevant Medications    ALPRAZolam (XANAX) 0.5 MG Tab      Other Visit Diagnoses     Lipid screening        Relevant Orders    Lipid Profile    Preventative health care        Relevant Orders    CBC WITH DIFFERENTIAL    Comp Metabolic Panel        Return in about 3 months (around 8/12/2022), or if symptoms worsen or fail to improve.    Please note that this dictation was created using voice recognition software. I have made every reasonable attempt to correct obvious errors, but I expect that there are errors of grammar and possibly content that I did not discover before finalizing the note.

## 2022-05-13 NOTE — ASSESSMENT & PLAN NOTE
This is a chronic problem, controlled with Xanax 0.5 mg tablet as needed while flying or traveling for work.  Patient does not take Xanax and drive, does not mix alcohol with Xanax.  He has no suicidal thoughts or ideations.  PDMP reviewed, last filled in February 9, 2022, 30 tablets.  Patient does not take medication daily, 30 tablets generally last him about 3 months.  Patient is aware he needs in person appointment for further refills.  Repeat pain screening obtained today.

## 2022-06-30 ENCOUNTER — OFFICE VISIT (OUTPATIENT)
Dept: MEDICAL GROUP | Facility: MEDICAL CENTER | Age: 32
End: 2022-06-30
Payer: COMMERCIAL

## 2022-06-30 VITALS
HEIGHT: 71 IN | DIASTOLIC BLOOD PRESSURE: 70 MMHG | BODY MASS INDEX: 29.94 KG/M2 | SYSTOLIC BLOOD PRESSURE: 110 MMHG | TEMPERATURE: 97.8 F | HEART RATE: 96 BPM | OXYGEN SATURATION: 95 % | WEIGHT: 213.85 LBS

## 2022-06-30 DIAGNOSIS — F41.0 PANIC ATTACKS: ICD-10-CM

## 2022-06-30 DIAGNOSIS — F41.0 PANIC ATTACK: ICD-10-CM

## 2022-06-30 PROCEDURE — 99213 OFFICE O/P EST LOW 20 MIN: CPT | Performed by: INTERNAL MEDICINE

## 2022-06-30 RX ORDER — PROPRANOLOL HYDROCHLORIDE 10 MG/1
10 TABLET ORAL 3 TIMES DAILY PRN
Qty: 90 TABLET | Refills: 1 | Status: SHIPPED | OUTPATIENT
Start: 2022-06-30 | End: 2023-01-23 | Stop reason: SDUPTHER

## 2022-06-30 ASSESSMENT — ENCOUNTER SYMPTOMS
DEPRESSION: 0
CONSTITUTIONAL NEGATIVE: 1
GASTROINTESTINAL NEGATIVE: 1
NERVOUS/ANXIOUS: 0
CARDIOVASCULAR NEGATIVE: 1
EYES NEGATIVE: 1
RESPIRATORY NEGATIVE: 1

## 2022-06-30 ASSESSMENT — FIBROSIS 4 INDEX: FIB4 SCORE: 0.49

## 2022-06-30 ASSESSMENT — LIFESTYLE VARIABLES: SUBSTANCE_ABUSE: 0

## 2022-06-30 NOTE — PROGRESS NOTES
Subjective:     Chief Complaint   Patient presents with   • Anxiety     Diagnoses of Panic attack and Panic attacks were pertinent to this visit.    HPI: Amparo is a pleasant 31 y.o. male who presents today with his wife Lynnette     Problem   Panic Attacks    This is a chronic problem, patient has weeks where he would have no episodes of anxiety or panic attacks but will, some weeks he would have them 2-3 times a week.  He is using Xanax as needed for those episodes. He is interested in trying alternative today as he is aware of addictive nature of Xanax and wide spectrum of side effects.  Discussed available SSRIs, SNRIs, as needed beta-blockers.  Patient opted for trial of beta-blockers as needed.         Past Medical History:   Diagnosis Date   • Anxiety    • GERD (gastroesophageal reflux disease)     occasional     Past Surgical History:   Procedure Laterality Date   • ENDOSCOPY,UPPER GI SIMPLE PRMY      5 years ago     Family History   Problem Relation Age of Onset   • Hypertension Mother    • Cancer Mother         breast   • Diabetes Father    • Kidney Disease Father         dialysis, ESRD   • Hypertension Father    • Hyperlipidemia Father    • Cancer Maternal Aunt         breast     Social History     Tobacco Use   • Smoking status: Light Tobacco Smoker   • Smokeless tobacco: Never Used   • Tobacco comment: occ cigar   Vaping Use   • Vaping Use: Never used   Substance Use Topics   • Alcohol use: Yes     Comment: 3-5 a week    • Drug use: Never       Current Outpatient Medications Ordered in Epic   Medication Sig Dispense Refill   • propranolol (INDERAL) 10 MG Tab Take 1 Tablet by mouth 3 times a day as needed (panic attasks/anxiety). 90 Tablet 1     No current Epic-ordered facility-administered medications on file.     Health Maintenance: Reviewed.    Review of Systems   Constitutional: Negative.    HENT: Negative.    Eyes: Negative.    Respiratory: Negative.    Cardiovascular: Negative.    Gastrointestinal:  "Negative.    Skin: Negative.    Psychiatric/Behavioral: Negative for depression and substance abuse. The patient is not nervous/anxious.            Objective:     Exam:  /70 (BP Location: Left arm, Patient Position: Sitting, BP Cuff Size: Adult)   Pulse 96   Temp 36.6 °C (97.8 °F) (Temporal)   Ht 1.803 m (5' 11\")   Wt 97 kg (213 lb 13.5 oz)   SpO2 95%   BMI 29.83 kg/m²  Body mass index is 29.83 kg/m².    Physical Exam  Constitutional:       Appearance: Normal appearance. He is normal weight.   HENT:      Head: Normocephalic and atraumatic.   Eyes:      General: No scleral icterus.  Cardiovascular:      Rate and Rhythm: Normal rate and regular rhythm.      Pulses: Normal pulses.      Heart sounds: Normal heart sounds. No murmur heard.  Pulmonary:      Effort: Pulmonary effort is normal.      Breath sounds: Normal breath sounds. No rhonchi.   Skin:     General: Skin is warm and dry.   Neurological:      General: No focal deficit present.      Mental Status: He is alert and oriented to person, place, and time.   Psychiatric:         Mood and Affect: Mood normal.         Behavior: Behavior normal.         Thought Content: Thought content normal.         Judgment: Judgment normal.       Labs: No labs pertinent to the visit.    Assessment & Plan:   Amparo  is a pleasant 31 y.o. male with the following -     Problem List Items Addressed This Visit     Panic attacks     On average 1-3 times a week.  Some weeks patient is symptoms free.  Most of his anxiety and panic attacks are related to traveling: Driving/flying.  We have went over preventative treatment of SSRIs, SNRIs, beta-blockers as needed.  Patient opted for trial of beta-blockers as needed.  Patient is not interested in daily preventative treatment at this time.             Other Visit Diagnoses     Panic attack        Relevant Medications    propranolol (INDERAL) 10 MG Tab        Return if symptoms worsen or fail to improve.    Please note that this " dictation was created using voice recognition software. I have made every reasonable attempt to correct obvious errors, but I expect that there are errors of grammar and possibly content that I did not discover before finalizing the note.

## 2022-06-30 NOTE — ASSESSMENT & PLAN NOTE
On average 1-3 times a week.  Some weeks patient is symptoms free.  Most of his anxiety and panic attacks are related to traveling: Driving/flying.  We have went over preventative treatment of SSRIs, SNRIs, beta-blockers as needed.  Patient opted for trial of beta-blockers as needed.  Patient is not interested in daily preventative treatment at this time.

## 2022-08-15 ENCOUNTER — OFFICE VISIT (OUTPATIENT)
Dept: MEDICAL GROUP | Facility: MEDICAL CENTER | Age: 32
End: 2022-08-15
Payer: COMMERCIAL

## 2022-08-15 VITALS — BODY MASS INDEX: 30.38 KG/M2 | WEIGHT: 217.81 LBS

## 2022-08-15 DIAGNOSIS — Z79.899 CHRONIC USE OF BENZODIAZEPINE FOR THERAPEUTIC PURPOSE: ICD-10-CM

## 2022-08-15 DIAGNOSIS — F41.0 PANIC ATTACKS: ICD-10-CM

## 2022-08-15 PROCEDURE — 99214 OFFICE O/P EST MOD 30 MIN: CPT | Performed by: INTERNAL MEDICINE

## 2022-08-15 RX ORDER — ALPRAZOLAM 0.5 MG/1
0.5 TABLET ORAL NIGHTLY PRN
Qty: 30 TABLET | Refills: 0 | Status: SHIPPED | OUTPATIENT
Start: 2022-08-15 | End: 2022-09-14

## 2022-08-15 ASSESSMENT — FIBROSIS 4 INDEX: FIB4 SCORE: 0.49

## 2022-08-15 ASSESSMENT — ENCOUNTER SYMPTOMS
CONSTITUTIONAL NEGATIVE: 1
EYES NEGATIVE: 1
CARDIOVASCULAR NEGATIVE: 1
RESPIRATORY NEGATIVE: 1
DEPRESSION: 0
NERVOUS/ANXIOUS: 0
GASTROINTESTINAL NEGATIVE: 1

## 2022-08-15 ASSESSMENT — LIFESTYLE VARIABLES: SUBSTANCE_ABUSE: 0

## 2022-08-15 NOTE — PROGRESS NOTES
Subjective:     Chief Complaint   Patient presents with    Medication Refill     Diagnoses of Chronic use of benzodiazepine for therapeutic purpose and Panic attacks were pertinent to this visit.    HPI: Amparo is a pleasant 31 y.o. male who presents today for medication refill.    Problem   Panic Attacks    This is a chronic problem, 0-3 episodes per week.  He is using Xanax as needed for those episodes.  During his last appointment, I have prescribed patient propranolol to use as needed and patient finds it helpful. However still prefers using Xanax for flying and road trips.  He does not drive, when takes Xanax.  His wife always drives him.     Chronic Use of Benzodiazepine for Therapeutic Purpose    Controlled substance medication f/u:     The patient is currently taking controlled substance(s) for the following condition(s): Situational anxiety    The patient is taking the medication(s) as prescribed.  Current medication regimen:  - Medication name: Alprazolam  - Medication dose: 0.5 mg  - Medication frequency: Daily as needed  - Patient last took the above medication: more than 3 days ago.   - Date of last refill: 05/14/2022 for #30 tabs for  30 day supply. Refills in recent history (last 6 months) only filled by prescribers at our office, filled at the same pharmacy under the same insurance coverage.     The patient reports their symptoms are well controlled on their current medication regimen.  Adverse effects: none.  [place] PDMP checked today: yes  Patient has a controlled substances contract verified within the last 1 year:yes             Current Outpatient Medications Ordered in Epic   Medication Sig Dispense Refill    propranolol (INDERAL) 10 MG Tab Take 1 Tablet by mouth 3 times a day as needed (panic attasks/anxiety). 90 Tablet 1     No current Epic-ordered facility-administered medications on file.     Review of Systems   Constitutional: Negative.    HENT: Negative.     Eyes: Negative.    Respiratory:  "Negative.     Cardiovascular: Negative.    Gastrointestinal: Negative.    Skin: Negative.    Psychiatric/Behavioral:  Negative for depression and substance abuse. The patient is not nervous/anxious.      Objective:     Exam:  BP (P) 122/84 (BP Location: Left arm, Patient Position: Sitting, BP Cuff Size: Adult)   Pulse (P) 84   Temp (P) 36.9 °C (98.4 °F) (Temporal)   Ht (P) 1.803 m (5' 11\")   Wt 98.8 kg (217 lb 13 oz)   SpO2 (P) 96%   BMI (P) 30.38 kg/m²  Body mass index is 30.38 kg/m² (pended).    Physical Exam  Constitutional:       Appearance: Normal appearance. He is normal weight.   HENT:      Head: Normocephalic and atraumatic.   Cardiovascular:      Rate and Rhythm: Normal rate and regular rhythm.      Pulses: Normal pulses.      Heart sounds: Normal heart sounds. No murmur heard.  Pulmonary:      Effort: Pulmonary effort is normal. No respiratory distress.      Breath sounds: Normal breath sounds. No wheezing or rales.   Neurological:      General: No focal deficit present.      Mental Status: He is alert and oriented to person, place, and time.   Psychiatric:         Mood and Affect: Mood normal.         Behavior: Behavior normal.     Labs: pending completion    Assessment & Plan:   Amparo  is a pleasant 31 y.o. male with the following -     Problem List Items Addressed This Visit       Chronic use of benzodiazepine for therapeutic purpose (Chronic)     Details as above in HPI. PDMP reviewed today, no aberrant behavior.  Urine drug screen was normal limits. Continue alprazolam 0.5 mg tablet as needed.           Panic attacks (Chronic)     On average 0-3 attacks per weeks, most of the attacks, related to traveling, driving or flying. Patient is still not interested in the daily medicine, would rather use propranolol and Xanax as needed.            Return in about 3 months (around 11/15/2022), or if symptoms worsen or fail to improve.    Please note that this dictation was created using voice recognition " software. I have made every reasonable attempt to correct obvious errors, but I expect that there are errors of grammar and possibly content that I did not discover before finalizing the note.

## 2022-08-15 NOTE — ASSESSMENT & PLAN NOTE
Details as above in HPI. PDMP reviewed today, no aberrant behavior.  Urine drug screen was normal limits. Continue alprazolam 0.5 mg tablet as needed.

## 2022-08-15 NOTE — ASSESSMENT & PLAN NOTE
On average 0-3 attacks per weeks, most of the attacks, related to traveling, driving or flying. Patient is still not interested in the daily preventative medication, he would like to continue using propranolol and Xanax as needed.

## 2022-11-07 ENCOUNTER — APPOINTMENT (OUTPATIENT)
Dept: MEDICAL GROUP | Facility: MEDICAL CENTER | Age: 32
End: 2022-11-07
Payer: COMMERCIAL

## 2022-12-06 ENCOUNTER — OFFICE VISIT (OUTPATIENT)
Dept: MEDICAL GROUP | Facility: MEDICAL CENTER | Age: 32
End: 2022-12-06
Payer: COMMERCIAL

## 2022-12-06 VITALS
TEMPERATURE: 98.2 F | HEART RATE: 89 BPM | BODY MASS INDEX: 30.56 KG/M2 | OXYGEN SATURATION: 96 % | SYSTOLIC BLOOD PRESSURE: 118 MMHG | DIASTOLIC BLOOD PRESSURE: 78 MMHG | HEIGHT: 71 IN | WEIGHT: 218.26 LBS

## 2022-12-06 DIAGNOSIS — Z00.00 PREVENTATIVE HEALTH CARE: ICD-10-CM

## 2022-12-06 DIAGNOSIS — K21.9 GASTROESOPHAGEAL REFLUX DISEASE WITHOUT ESOPHAGITIS: ICD-10-CM

## 2022-12-06 DIAGNOSIS — Z13.220 ENCOUNTER FOR LIPID SCREENING FOR CARDIOVASCULAR DISEASE: ICD-10-CM

## 2022-12-06 DIAGNOSIS — Z79.899 CHRONIC USE OF BENZODIAZEPINE FOR THERAPEUTIC PURPOSE: Chronic | ICD-10-CM

## 2022-12-06 DIAGNOSIS — Z13.29 THYROID DISORDER SCREEN: ICD-10-CM

## 2022-12-06 DIAGNOSIS — F41.8 SITUATIONAL ANXIETY: ICD-10-CM

## 2022-12-06 DIAGNOSIS — Z13.6 ENCOUNTER FOR LIPID SCREENING FOR CARDIOVASCULAR DISEASE: ICD-10-CM

## 2022-12-06 PROCEDURE — 99214 OFFICE O/P EST MOD 30 MIN: CPT | Performed by: INTERNAL MEDICINE

## 2022-12-06 RX ORDER — ALPRAZOLAM 0.5 MG/1
0.5 TABLET ORAL NIGHTLY PRN
Qty: 30 TABLET | Refills: 0 | Status: SHIPPED | OUTPATIENT
Start: 2022-12-06 | End: 2023-01-05

## 2022-12-06 ASSESSMENT — LIFESTYLE VARIABLES: SUBSTANCE_ABUSE: 0

## 2022-12-06 ASSESSMENT — ENCOUNTER SYMPTOMS
DEPRESSION: 0
RESPIRATORY NEGATIVE: 1
HEARTBURN: 1
NERVOUS/ANXIOUS: 0
CARDIOVASCULAR NEGATIVE: 1
CONSTITUTIONAL NEGATIVE: 1
EYES NEGATIVE: 1

## 2022-12-06 ASSESSMENT — FIBROSIS 4 INDEX: FIB4 SCORE: 0.51

## 2022-12-06 NOTE — ASSESSMENT & PLAN NOTE
See HPI, PDMP reviewed today.  Last fill on 8/16/2022, 30 tablets.  [facility] pain screening within normal limits from May 29, 2022.

## 2022-12-06 NOTE — ASSESSMENT & PLAN NOTE
- Take Omeprazole Qd  - Heart burn lifestyle modification         - No heavy meals. Frequent small meals        - No lying within 3 hours after a meal        - Elevate head of bed         - Avoid spicy and acidic foods (e.g., carbonated drink, chocolate, caffeine, mint, tomatoes, etc.)

## 2022-12-06 NOTE — PROGRESS NOTES
Subjective:     Diagnoses of Encounter for lipid screening for cardiovascular disease, Preventative health care, Thyroid disorder screen, Situational anxiety, Gastroesophageal reflux disease without esophagitis, and Chronic use of benzodiazepine for therapeutic purpose were pertinent to this visit.    HPI: Amparo is a pleasant 32 y.o. male who presents today with his Wife Lynnette for follow-up on anxiety.    Problem   Gastroesophageal Reflux Disease Without Esophagitis    Triggered by spicy meals.  Managed with Tums and Pepto-Bismol as needed.       Chronic Use of Benzodiazepine for Therapeutic Purpose    Controlled substance medication f/u:     The patient is currently taking controlled substance(s) for the following condition(s): Situational anxiety    The patient is taking the medication(s) as prescribed.  Current medication regimen:  - Medication name: Alprazolam  - Medication dose: 0.5 mg  - Medication frequency: Daily as needed  - Patient last took the above medication: more than 3 days ago.   - Date of last refill: 08/16/2022 for #30 tabs for  30 day supply. Refills in recent history (last 6 months) only filled by prescribers at our office, filled at the same pharmacy under the same insurance coverage.     The patient reports their symptoms are well controlled on their current medication regimen.  Adverse effects: none.  [place] PDMP checked today: yes  Patient has a controlled substances contract verified within the last 1 year:yes           Past Medical History:   Diagnosis Date    Anxiety     GERD (gastroesophageal reflux disease)     occasional     Current Outpatient Medications Ordered in Epic   Medication Sig Dispense Refill    ALPRAZolam (XANAX) 0.5 MG Tab Take 1 Tablet by mouth at bedtime as needed for Anxiety for up to 30 days. 30 Tablet 0    propranolol (INDERAL) 10 MG Tab Take 1 Tablet by mouth 3 times a day as needed (panic attasks/anxiety). 90 Tablet 1     No current Epic-ordered facility-administered  "medications on file.     Health Maintenance: Patient has not received vaccinations, she will be due for Tdap in ~ 2 years.    Review of Systems   Constitutional: Negative.    HENT: Negative.     Eyes: Negative.    Respiratory: Negative.     Cardiovascular: Negative.    Gastrointestinal:  Positive for heartburn.   Skin: Negative.    Psychiatric/Behavioral:  Negative for depression and substance abuse. The patient is not nervous/anxious.      Objective:     Exam:  /78 (BP Location: Left arm, Patient Position: Sitting, BP Cuff Size: Adult)   Pulse 89   Temp 36.8 °C (98.2 °F) (Temporal)   Ht 1.803 m (5' 11\")   Wt 99 kg (218 lb 4.1 oz)   SpO2 96%   BMI 30.44 kg/m²  Body mass index is 30.44 kg/m².    Physical Exam  Constitutional:       Appearance: Normal appearance. He is normal weight.   HENT:      Head: Normocephalic and atraumatic.   Cardiovascular:      Rate and Rhythm: Normal rate and regular rhythm.      Pulses: Normal pulses.      Heart sounds: Normal heart sounds. No murmur heard.  Pulmonary:      Effort: Pulmonary effort is normal. No respiratory distress.      Breath sounds: Normal breath sounds. No wheezing or rales.   Neurological:      General: No focal deficit present.      Mental Status: He is alert and oriented to person, place, and time.   Psychiatric:         Behavior: Behavior normal.     Labs: per orders     Assessment & Plan:   Amparo  is a pleasant 32 y.o. male with the following -     Problem List Items Addressed This Visit       Chronic use of benzodiazepine for therapeutic purpose (Chronic)     See HPI, PDMP reviewed today.  Last fill on 8/16/2022, 30 tablets.  [facility] pain screening within normal limits from May 29, 2022.           Gastroesophageal reflux disease without esophagitis     - Take Omeprazole Qd  - Heart burn lifestyle modification         - No heavy meals. Frequent small meals        - No lying within 3 hours after a meal        - Elevate head of bed         - Avoid " spicy and acidic foods (e.g., carbonated drink, chocolate, caffeine, mint, tomatoes, etc.)             Situational anxiety    Relevant Medications    ALPRAZolam (XANAX) 0.5 MG Tab     Other Visit Diagnoses       Encounter for lipid screening for cardiovascular disease        Relevant Orders    Lipid Profile    Preventative health care        Relevant Orders    Comp Metabolic Panel    CBC WITH DIFFERENTIAL    Thyroid disorder screen        Relevant Orders    TSH WITH REFLEX TO FT4            Return in about 3 months (around 3/6/2023), or if symptoms worsen or fail to improve, for xanax refill .    Please note that this dictation was created using voice recognition software. I have made every reasonable attempt to correct obvious errors, but I expect that there are errors of grammar and possibly content that I did not discover before finalizing the note.

## 2023-01-23 DIAGNOSIS — F41.0 PANIC ATTACK: ICD-10-CM

## 2023-01-23 RX ORDER — PROPRANOLOL HYDROCHLORIDE 10 MG/1
10 TABLET ORAL 3 TIMES DAILY PRN
Qty: 90 TABLET | Refills: 1 | Status: SHIPPED | OUTPATIENT
Start: 2023-01-23 | End: 2023-06-06

## 2023-03-06 ENCOUNTER — APPOINTMENT (OUTPATIENT)
Dept: MEDICAL GROUP | Facility: MEDICAL CENTER | Age: 33
End: 2023-03-06
Payer: COMMERCIAL

## 2023-06-06 DIAGNOSIS — F41.0 PANIC ATTACK: ICD-10-CM

## 2023-06-06 RX ORDER — PROPRANOLOL HYDROCHLORIDE 10 MG/1
10 TABLET ORAL 3 TIMES DAILY PRN
Qty: 90 TABLET | Refills: 1 | Status: SHIPPED | OUTPATIENT
Start: 2023-06-06 | End: 2023-10-19 | Stop reason: SDUPTHER

## 2023-08-23 ENCOUNTER — OFFICE VISIT (OUTPATIENT)
Dept: MEDICAL GROUP | Facility: MEDICAL CENTER | Age: 33
End: 2023-08-23
Payer: COMMERCIAL

## 2023-08-23 VITALS
WEIGHT: 229.28 LBS | OXYGEN SATURATION: 95 % | TEMPERATURE: 97.8 F | HEART RATE: 101 BPM | BODY MASS INDEX: 39.14 KG/M2 | HEIGHT: 64 IN | DIASTOLIC BLOOD PRESSURE: 78 MMHG | SYSTOLIC BLOOD PRESSURE: 98 MMHG

## 2023-08-23 DIAGNOSIS — F41.0 PANIC ATTACKS: Chronic | ICD-10-CM

## 2023-08-23 DIAGNOSIS — Z79.899 CHRONIC USE OF BENZODIAZEPINE FOR THERAPEUTIC PURPOSE: Chronic | ICD-10-CM

## 2023-08-23 PROCEDURE — 3078F DIAST BP <80 MM HG: CPT | Performed by: INTERNAL MEDICINE

## 2023-08-23 PROCEDURE — 3074F SYST BP LT 130 MM HG: CPT | Performed by: INTERNAL MEDICINE

## 2023-08-23 PROCEDURE — 99213 OFFICE O/P EST LOW 20 MIN: CPT | Performed by: INTERNAL MEDICINE

## 2023-08-23 RX ORDER — ALPRAZOLAM 0.5 MG/1
0.5 TABLET ORAL NIGHTLY PRN
Qty: 30 TABLET | Refills: 0 | Status: SHIPPED | OUTPATIENT
Start: 2023-08-23 | End: 2023-09-22

## 2023-08-23 RX ORDER — ALPRAZOLAM 0.5 MG/1
TABLET ORAL
COMMUNITY
Start: 2018-02-26 | End: 2023-08-23

## 2023-08-23 NOTE — PROGRESS NOTES
Subjective:     CC:   Diagnoses of Chronic use of benzodiazepine for therapeutic purpose and Panic attacks were pertinent to this visit.    HPI: Amparo is a pleasant 32 y.o. male who presents today with his wife Lynnette for medication follow-up    Problem   Panic Attacks    This is a chronic problem, 0-3 episodes per week.  He is using propranolol and Xanax as needed for his symptoms.  Propranolol for mild symptoms and Xanax for moderate to severe symptoms.   He does not drive, when takes Xanax.  His wife always drives him.     Chronic Use of Benzodiazepine for Therapeutic Purpose    Controlled substance medication f/u:     The patient is currently taking controlled substance(s) for the following condition(s): Situational anxiety    The patient is taking the medication(s) as prescribed.  Current medication regimen:  - Medication name: Alprazolam  - Medication dose: 0.5 mg  - Medication frequency: Daily as needed  - Patient last took the above medication: more than 3 days ago.   - Date of last refill: 12/06/2022 for #30 tabs for  30 day supply. Refills in recent history (last 6 months) only filled by prescribers at our office, filled at the same pharmacy under the same insurance coverage.     The patient reports their symptoms are well controlled on their current medication regimen.  Adverse effects: none.  [place] PDMP checked today: 08/23/23  Patient has a controlled substances contract verified within the last 1 year: 08/23/23  UDS collected 08/23/23               Past Medical History:   Diagnosis Date    Anxiety     GERD (gastroesophageal reflux disease)     occasional     Current Outpatient Medications Ordered in Epic   Medication Sig Dispense Refill    propranolol (INDERAL) 10 MG Tab TAKE 1 TABLET BY MOUTH 3 TIMES A DAY AS NEEDED (PANIC ATTASKS/ANXIETY). 90 Tablet 1     No current Epic-ordered facility-administered medications on file.     Review of Systems   All other systems reviewed and are negative.    Objective:  "    Exam:  BP 98/78 (BP Location: Right arm, Patient Position: Sitting, BP Cuff Size: Adult)   Pulse (!) 101   Temp 36.6 °C (97.8 °F) (Temporal)   Ht 1.626 m (5' 4\")   Wt 104 kg (229 lb 4.5 oz)   SpO2 95%   BMI 39.36 kg/m²  Body mass index is 39.36 kg/m².    Physical Exam  Constitutional:       Appearance: Normal appearance.   Cardiovascular:      Rate and Rhythm: Normal rate and regular rhythm.      Pulses: Normal pulses.      Heart sounds: Normal heart sounds. No murmur heard.  Pulmonary:      Effort: Pulmonary effort is normal. No respiratory distress.      Breath sounds: Normal breath sounds.   Neurological:      General: No focal deficit present.      Mental Status: He is alert and oriented to person, place, and time.   Psychiatric:         Mood and Affect: Mood normal.         Thought Content: Thought content normal.         Judgment: Judgment normal.       Labs: pending     Assessment & Plan:   Amparo  is a pleasant 32 y.o. male with the following -     Problem List Items Addressed This Visit       Chronic use of benzodiazepine for therapeutic purpose (Chronic)    Relevant Orders    PAIN MANAGEMENT SCRN, UR    Controlled Substance Treatment Agreement    Panic attacks (Chronic)     Chronic problem, patient continues to benefit from sporadic use of Xanax and propranolol.  He denies side effects.            Return in about 3 months (around 11/23/2023), or if symptoms worsen or fail to improve.    Please note that this dictation was created using voice recognition software. I have made every reasonable attempt to correct obvious errors, but I expect that there are errors of grammar and possibly content that I did not discover before finalizing the note.        "

## 2023-08-23 NOTE — ASSESSMENT & PLAN NOTE
Chronic problem, patient continues to benefit from sporadic use of Xanax and propranolol.  He denies side effects.

## 2023-10-19 DIAGNOSIS — F41.0 PANIC ATTACK: ICD-10-CM

## 2023-10-20 RX ORDER — PROPRANOLOL HYDROCHLORIDE 10 MG/1
10 TABLET ORAL 3 TIMES DAILY PRN
Qty: 90 TABLET | Refills: 1 | Status: SHIPPED | OUTPATIENT
Start: 2023-10-20 | End: 2024-02-21

## 2023-10-28 ENCOUNTER — HOSPITAL ENCOUNTER (OUTPATIENT)
Dept: LAB | Facility: MEDICAL CENTER | Age: 33
End: 2023-10-28
Attending: INTERNAL MEDICINE
Payer: COMMERCIAL

## 2023-10-28 DIAGNOSIS — Z13.220 ENCOUNTER FOR LIPID SCREENING FOR CARDIOVASCULAR DISEASE: ICD-10-CM

## 2023-10-28 DIAGNOSIS — Z00.00 PREVENTATIVE HEALTH CARE: ICD-10-CM

## 2023-10-28 DIAGNOSIS — Z13.6 ENCOUNTER FOR LIPID SCREENING FOR CARDIOVASCULAR DISEASE: ICD-10-CM

## 2023-10-28 DIAGNOSIS — Z13.29 THYROID DISORDER SCREEN: ICD-10-CM

## 2023-10-28 LAB
ALBUMIN SERPL BCP-MCNC: 4.6 G/DL (ref 3.2–4.9)
ALBUMIN/GLOB SERPL: 1.5 G/DL
ALP SERPL-CCNC: 87 U/L (ref 30–99)
ALT SERPL-CCNC: 27 U/L (ref 2–50)
ANION GAP SERPL CALC-SCNC: 12 MMOL/L (ref 7–16)
AST SERPL-CCNC: 16 U/L (ref 12–45)
BASOPHILS # BLD AUTO: 0.5 % (ref 0–1.8)
BASOPHILS # BLD: 0.03 K/UL (ref 0–0.12)
BILIRUB SERPL-MCNC: 1.4 MG/DL (ref 0.1–1.5)
BUN SERPL-MCNC: 15 MG/DL (ref 8–22)
CALCIUM ALBUM COR SERPL-MCNC: 8.9 MG/DL (ref 8.5–10.5)
CALCIUM SERPL-MCNC: 9.4 MG/DL (ref 8.5–10.5)
CHLORIDE SERPL-SCNC: 104 MMOL/L (ref 96–112)
CHOLEST SERPL-MCNC: 188 MG/DL (ref 100–199)
CO2 SERPL-SCNC: 23 MMOL/L (ref 20–33)
CREAT SERPL-MCNC: 0.86 MG/DL (ref 0.5–1.4)
EOSINOPHIL # BLD AUTO: 0.22 K/UL (ref 0–0.51)
EOSINOPHIL NFR BLD: 4 % (ref 0–6.9)
ERYTHROCYTE [DISTWIDTH] IN BLOOD BY AUTOMATED COUNT: 38.8 FL (ref 35.9–50)
GFR SERPLBLD CREATININE-BSD FMLA CKD-EPI: 118 ML/MIN/1.73 M 2
GLOBULIN SER CALC-MCNC: 3 G/DL (ref 1.9–3.5)
GLUCOSE SERPL-MCNC: 94 MG/DL (ref 65–99)
HCT VFR BLD AUTO: 48.9 % (ref 42–52)
HDLC SERPL-MCNC: 35 MG/DL
HGB BLD-MCNC: 17.4 G/DL (ref 14–18)
IMM GRANULOCYTES # BLD AUTO: 0.01 K/UL (ref 0–0.11)
IMM GRANULOCYTES NFR BLD AUTO: 0.2 % (ref 0–0.9)
LDLC SERPL CALC-MCNC: 119 MG/DL
LYMPHOCYTES # BLD AUTO: 1.26 K/UL (ref 1–4.8)
LYMPHOCYTES NFR BLD: 22.8 % (ref 22–41)
MCH RBC QN AUTO: 31.6 PG (ref 27–33)
MCHC RBC AUTO-ENTMCNC: 35.6 G/DL (ref 32.3–36.5)
MCV RBC AUTO: 88.7 FL (ref 81.4–97.8)
MONOCYTES # BLD AUTO: 0.43 K/UL (ref 0–0.85)
MONOCYTES NFR BLD AUTO: 7.8 % (ref 0–13.4)
NEUTROPHILS # BLD AUTO: 3.57 K/UL (ref 1.82–7.42)
NEUTROPHILS NFR BLD: 64.7 % (ref 44–72)
NRBC # BLD AUTO: 0 K/UL
NRBC BLD-RTO: 0 /100 WBC (ref 0–0.2)
PLATELET # BLD AUTO: 232 K/UL (ref 164–446)
PMV BLD AUTO: 11 FL (ref 9–12.9)
POTASSIUM SERPL-SCNC: 4.1 MMOL/L (ref 3.6–5.5)
PROT SERPL-MCNC: 7.6 G/DL (ref 6–8.2)
RBC # BLD AUTO: 5.51 M/UL (ref 4.7–6.1)
SODIUM SERPL-SCNC: 139 MMOL/L (ref 135–145)
TRIGL SERPL-MCNC: 172 MG/DL (ref 0–149)
TSH SERPL DL<=0.005 MIU/L-ACNC: 0.58 UIU/ML (ref 0.38–5.33)
WBC # BLD AUTO: 5.5 K/UL (ref 4.8–10.8)

## 2023-10-28 PROCEDURE — 36415 COLL VENOUS BLD VENIPUNCTURE: CPT

## 2023-10-28 PROCEDURE — 84443 ASSAY THYROID STIM HORMONE: CPT

## 2023-10-28 PROCEDURE — 85025 COMPLETE CBC W/AUTO DIFF WBC: CPT

## 2023-10-28 PROCEDURE — 80053 COMPREHEN METABOLIC PANEL: CPT

## 2023-10-28 PROCEDURE — 80061 LIPID PANEL: CPT

## 2023-11-27 ENCOUNTER — APPOINTMENT (OUTPATIENT)
Dept: MEDICAL GROUP | Facility: MEDICAL CENTER | Age: 33
End: 2023-11-27
Payer: COMMERCIAL

## 2023-12-13 ENCOUNTER — APPOINTMENT (OUTPATIENT)
Dept: MEDICAL GROUP | Facility: MEDICAL CENTER | Age: 33
End: 2023-12-13
Payer: COMMERCIAL

## 2023-12-21 ENCOUNTER — OFFICE VISIT (OUTPATIENT)
Dept: MEDICAL GROUP | Facility: MEDICAL CENTER | Age: 33
End: 2023-12-21
Payer: COMMERCIAL

## 2023-12-21 VITALS
DIASTOLIC BLOOD PRESSURE: 82 MMHG | OXYGEN SATURATION: 97 % | BODY MASS INDEX: 39.14 KG/M2 | HEIGHT: 64 IN | HEART RATE: 72 BPM | WEIGHT: 229.28 LBS | SYSTOLIC BLOOD PRESSURE: 112 MMHG | TEMPERATURE: 98.3 F

## 2023-12-21 DIAGNOSIS — E78.5 DYSLIPIDEMIA: ICD-10-CM

## 2023-12-21 DIAGNOSIS — F41.8 SITUATIONAL ANXIETY: ICD-10-CM

## 2023-12-21 DIAGNOSIS — Z51.81 ENCOUNTER FOR THERAPEUTIC DRUG LEVEL MONITORING: ICD-10-CM

## 2023-12-21 DIAGNOSIS — Z79.899 CHRONIC USE OF BENZODIAZEPINE FOR THERAPEUTIC PURPOSE: Chronic | ICD-10-CM

## 2023-12-21 DIAGNOSIS — F41.0 PANIC ATTACKS: Chronic | ICD-10-CM

## 2023-12-21 PROCEDURE — 3079F DIAST BP 80-89 MM HG: CPT | Performed by: INTERNAL MEDICINE

## 2023-12-21 PROCEDURE — 99214 OFFICE O/P EST MOD 30 MIN: CPT | Performed by: INTERNAL MEDICINE

## 2023-12-21 PROCEDURE — 3074F SYST BP LT 130 MM HG: CPT | Performed by: INTERNAL MEDICINE

## 2023-12-21 RX ORDER — ALPRAZOLAM 0.5 MG/1
0.5 TABLET ORAL NIGHTLY PRN
Qty: 30 TABLET | Refills: 0 | Status: SHIPPED | OUTPATIENT
Start: 2023-12-21 | End: 2024-03-20

## 2023-12-21 ASSESSMENT — FIBROSIS 4 INDEX: FIB4 SCORE: 0.42

## 2023-12-21 ASSESSMENT — PATIENT HEALTH QUESTIONNAIRE - PHQ9: CLINICAL INTERPRETATION OF PHQ2 SCORE: 0

## 2023-12-21 NOTE — PROGRESS NOTES
Subjective:     CC:   Diagnoses of Chronic use of benzodiazepine for therapeutic purpose, Encounter for therapeutic drug level monitoring, Dyslipidemia, Panic attacks, and Situational anxiety were pertinent to this visit.    HPI: Amparo is a pleasant 33 y.o. male who presents today for medication follow-up.    Problem   Dyslipidemia      Lab Results   Component Value Date/Time    CHOLSTRLTOT 188 10/28/2023 09:08 AM     (H) 10/28/2023 09:08 AM    HDL 35 (A) 10/28/2023 09:08 AM    TRIGLYCERIDE 172 (H) 10/28/2023 09:08 AM   Healthful lifestyle measures       Panic Attacks    This is a chronic problem, 0-3 episodes per week.  He is using propranolol and Xanax as needed for his symptoms.  Propranolol for mild symptoms and Xanax for moderate to severe symptoms.   He does not drive, when takes Xanax.  His wife always drives him.      Chronic Use of Benzodiazepine for Therapeutic Purpose    Controlled substance medication f/u:     The patient is currently taking controlled substance(s) for the following condition(s): Situational anxiety    The patient is taking the medication(s) as prescribed.  Current medication regimen:  - Medication name: Alprazolam  - Medication dose: 0.5 mg  - Medication frequency: Daily as needed  - Patient last took the above medication: more than 3 days ago.      The patient reports their symptoms are well controlled on their current medication regimen.  Adverse effects: none.  [place] PDMP checked today: 12/21/2023, last filled 08/23/2023 30 tablets   Patient has a controlled substances contract verified within the last 1 year: 08/23/23  UDS collected 08/23/23, + for ETOH, recollect today                Past Medical History:   Diagnosis Date    Anxiety     GERD (gastroesophageal reflux disease)     occasional     Current Outpatient Medications Ordered in Epic   Medication Sig Dispense Refill    ALPRAZolam (XANAX) 0.5 MG Tab Take 1 Tablet by mouth at bedtime as needed for Anxiety for up to 90 days.  "30 Tablet 0    propranolol (INDERAL) 10 MG Tab Take 1 Tablet by mouth 3 times a day as needed (panic attasks/anxiety). 90 Tablet 1     No current Epic-ordered facility-administered medications on file.     Review of Systems   All other systems reviewed and are negative.    Objective:     Exam:  /82 (BP Location: Left arm, Patient Position: Sitting, BP Cuff Size: Adult)   Pulse 72   Temp 36.8 °C (98.3 °F) (Temporal)   Ht 1.626 m (5' 4\")   Wt 104 kg (229 lb 4.5 oz)   SpO2 97%   BMI 39.36 kg/m²  Body mass index is 39.36 kg/m².    Physical Exam  Constitutional:       Appearance: Normal appearance.   Cardiovascular:      Rate and Rhythm: Normal rate and regular rhythm.      Pulses: Normal pulses.      Heart sounds: Normal heart sounds. No murmur heard.  Pulmonary:      Effort: Pulmonary effort is normal. No respiratory distress.      Breath sounds: Normal breath sounds.   Neurological:      General: No focal deficit present.      Mental Status: He is alert and oriented to person, place, and time.   Psychiatric:         Mood and Affect: Mood normal.       Labs: Discussed CBC, CMP, lipid panel, TSH from 10/28/2023    Assessment & Plan:   Amparo  is a pleasant 33 y.o. male with the following -     Problem List Items Addressed This Visit       Chronic use of benzodiazepine for therapeutic purpose (Chronic)     UDS recollected, continue alprazolam as directed         Relevant Medications    ALPRAZolam (XANAX) 0.5 MG Tab    Dyslipidemia (Chronic)    Panic attacks (Chronic)    Relevant Medications    ALPRAZolam (XANAX) 0.5 MG Tab    Situational anxiety    Relevant Medications    ALPRAZolam (XANAX) 0.5 MG Tab     Other Visit Diagnoses       Encounter for therapeutic drug level monitoring        Relevant Orders    PAIN MANAGEMENT SCRN, UR          Return in about 3 months (around 3/21/2024), or if symptoms worsen or fail to improve.    Please note that this dictation was created using voice recognition software. I have " made every reasonable attempt to correct obvious errors, but I expect that there are errors of grammar and possibly content that I did not discover before finalizing the note.

## 2024-02-21 DIAGNOSIS — F41.0 PANIC ATTACK: ICD-10-CM

## 2024-02-21 RX ORDER — PROPRANOLOL HYDROCHLORIDE 10 MG/1
10 TABLET ORAL 3 TIMES DAILY PRN
Qty: 90 TABLET | Refills: 1 | Status: SHIPPED | OUTPATIENT
Start: 2024-02-21

## 2024-02-21 NOTE — TELEPHONE ENCOUNTER
Received request via: Pharmacy    Was the patient seen in the last year in this department? Yes    Does the patient have an active prescription (recently filled or refills available) for medication(s) requested? No        Does the patient have assisted Plus and need 100 day supply (blood pressure, diabetes and cholesterol meds only)? Patient does not have SCP

## 2024-03-21 ENCOUNTER — OFFICE VISIT (OUTPATIENT)
Dept: MEDICAL GROUP | Facility: MEDICAL CENTER | Age: 34
End: 2024-03-21
Payer: COMMERCIAL

## 2024-03-21 VITALS
DIASTOLIC BLOOD PRESSURE: 78 MMHG | OXYGEN SATURATION: 95 % | WEIGHT: 224.87 LBS | HEIGHT: 64 IN | BODY MASS INDEX: 38.39 KG/M2 | SYSTOLIC BLOOD PRESSURE: 108 MMHG | TEMPERATURE: 98.3 F | HEART RATE: 84 BPM

## 2024-03-21 DIAGNOSIS — E55.9 VITAMIN D DEFICIENCY: ICD-10-CM

## 2024-03-21 DIAGNOSIS — Z79.899 CHRONIC USE OF BENZODIAZEPINE FOR THERAPEUTIC PURPOSE: Chronic | ICD-10-CM

## 2024-03-21 DIAGNOSIS — F41.0 PANIC ATTACKS: Chronic | ICD-10-CM

## 2024-03-21 DIAGNOSIS — R53.83 OTHER FATIGUE: ICD-10-CM

## 2024-03-21 PROCEDURE — 3074F SYST BP LT 130 MM HG: CPT | Performed by: INTERNAL MEDICINE

## 2024-03-21 PROCEDURE — 3078F DIAST BP <80 MM HG: CPT | Performed by: INTERNAL MEDICINE

## 2024-03-21 PROCEDURE — 99214 OFFICE O/P EST MOD 30 MIN: CPT | Performed by: INTERNAL MEDICINE

## 2024-03-21 RX ORDER — ALPRAZOLAM 0.5 MG/1
0.5 TABLET ORAL NIGHTLY PRN
Qty: 30 TABLET | Refills: 0 | Status: SHIPPED | OUTPATIENT
Start: 2024-03-21 | End: 2024-04-20

## 2024-03-21 ASSESSMENT — ENCOUNTER SYMPTOMS
VOMITING: 0
CONSTIPATION: 0
DIARRHEA: 0
SHORTNESS OF BREATH: 0
ABDOMINAL PAIN: 0

## 2024-03-21 ASSESSMENT — PATIENT HEALTH QUESTIONNAIRE - PHQ9: CLINICAL INTERPRETATION OF PHQ2 SCORE: 0

## 2024-03-21 ASSESSMENT — FIBROSIS 4 INDEX: FIB4 SCORE: 0.42

## 2024-03-21 NOTE — PROGRESS NOTES
Subjective:     CC:   Diagnoses of Chronic use of benzodiazepine for therapeutic purpose, Panic attacks, Other fatigue, and Vitamin D deficiency were pertinent to this visit.    HPI: Amparo is a pleasant 33 y.o. male who presents today with his wife MARY to discuss the following problems:     Problem   Other Fatigue    This is an ongoing problem.  For the last several months patient has been experiencing energy level motivation.   Sleep: He gets at least 8 hours of sleep at night  Caffeine: Minimal amount  Diet: Various, meat, vegetables  Exercise: Less frequent lately, on average 1-4 times a week  Associated symptoms: No fever, chills, chest pain, shortness of breath, unintentional weight loss.     Panic Attacks    This is a chronic problem, 0-3 episodes per week.  He is using propranolol and Xanax as needed for his symptoms.  Propranolol for mild symptoms and Xanax for moderate to severe symptoms.    He does not drive, when takes Xanax.  His wife always drives him.       Chronic Use of Benzodiazepine for Therapeutic Purpose    Controlled substance medication f/u:     The patient is currently taking controlled substance(s) for the following condition(s): Situational anxiety    The patient is taking the medication(s) as prescribed.  Current medication regimen:  - Medication name: Alprazolam  - Medication dose: 0.5 mg  - Medication frequency: Daily as needed  - Patient last took the above medication: more than 3 days ago.       The patient reports their symptoms are well controlled on their current medication regimen.  Adverse effects: none.  [place] PDMP checked today[place] 03/21/24, last filled 12/21/2023  30 tablets   Patient has a controlled substances contract verified within the last 1 year: 08/23/23  UDS collected 08/23/23, + for ETOH, recollect 12/2023 - WNL                Past Medical History:   Diagnosis Date    Anxiety     GERD (gastroesophageal reflux disease)     occasional     Current Outpatient Medications Ordered  "in Epic   Medication Sig Dispense Refill    propranolol (INDERAL) 10 MG Tab TAKE 1 TABLET BY MOUTH 3 TIMES A DAY AS NEEDED (PANIC ATTASKS/ANXIETY). 90 Tablet 1     No current Epic-ordered facility-administered medications on file.     Review of Systems   Constitutional:  Positive for malaise/fatigue.   Respiratory:  Negative for shortness of breath.    Cardiovascular:  Negative for chest pain.   Gastrointestinal:  Negative for abdominal pain, constipation, diarrhea and vomiting.   All other systems reviewed and are negative.    Objective:     Exam:  /78 (BP Location: Left arm, Patient Position: Sitting, BP Cuff Size: Adult)   Pulse 84   Temp 36.8 °C (98.3 °F) (Temporal)   Ht 1.626 m (5' 4\")   Wt 102 kg (224 lb 13.9 oz)   SpO2 95%   BMI 38.60 kg/m²  Body mass index is 38.6 kg/m².    Physical Exam  Constitutional:       Appearance: Normal appearance.   HENT:      Head: Normocephalic and atraumatic.   Cardiovascular:      Rate and Rhythm: Normal rate and regular rhythm.      Pulses: Normal pulses.      Heart sounds: Normal heart sounds. No murmur heard.  Pulmonary:      Effort: Pulmonary effort is normal. No respiratory distress.      Breath sounds: Normal breath sounds.   Neurological:      Mental Status: He is alert and oriented to person, place, and time.   Psychiatric:         Mood and Affect: Mood normal.       Assessment & Plan:   Amparo  is a pleasant 33 y.o. male with the following -     Problem List Items Addressed This Visit       Chronic use of benzodiazepine for therapeutic purpose (Chronic)     Chronic problem, stable on alprazolam as needed.         Panic attacks (Chronic)    Other fatigue     Ongoing problem, obtain additional workup as below.           Relevant Orders    Testosterone, Free & Total, Adult Male (w/SHBG)    VITAMIN B12    FOLATE    IRON/TOTAL IRON BIND     Other Visit Diagnoses       Vitamin D deficiency        Relevant Orders    VITAMIN D,25 HYDROXY (DEFICIENCY)          Return " in about 3 months (around 6/21/2024), or if symptoms worsen or fail to improve, for 3 mo follow up.    Please note that this dictation was created using voice recognition software. I have made every reasonable attempt to correct obvious errors, but I expect that there are errors of grammar and possibly content that I did not discover before finalizing the note.

## 2024-06-02 DIAGNOSIS — F41.0 PANIC ATTACK: ICD-10-CM

## 2024-06-03 RX ORDER — PROPRANOLOL HYDROCHLORIDE 10 MG/1
10 TABLET ORAL 3 TIMES DAILY PRN
Qty: 90 TABLET | Refills: 1 | Status: SHIPPED | OUTPATIENT
Start: 2024-06-03

## 2024-07-15 ENCOUNTER — APPOINTMENT (OUTPATIENT)
Dept: RADIOLOGY | Facility: IMAGING CENTER | Age: 34
End: 2024-07-15
Attending: FAMILY MEDICINE
Payer: COMMERCIAL

## 2024-07-15 ENCOUNTER — OFFICE VISIT (OUTPATIENT)
Dept: URGENT CARE | Facility: CLINIC | Age: 34
End: 2024-07-15
Payer: COMMERCIAL

## 2024-07-15 VITALS
RESPIRATION RATE: 16 BRPM | HEART RATE: 92 BPM | WEIGHT: 222.1 LBS | SYSTOLIC BLOOD PRESSURE: 110 MMHG | DIASTOLIC BLOOD PRESSURE: 82 MMHG | HEIGHT: 71 IN | TEMPERATURE: 97.4 F | BODY MASS INDEX: 31.09 KG/M2 | OXYGEN SATURATION: 98 %

## 2024-07-15 DIAGNOSIS — L08.9 TRAUMATIC OPEN WOUND OF RIGHT LOWER LEG WITH INFECTION, INITIAL ENCOUNTER: ICD-10-CM

## 2024-07-15 DIAGNOSIS — S80.11XA CONTUSION OF RIGHT LOWER EXTREMITY, INITIAL ENCOUNTER: ICD-10-CM

## 2024-07-15 DIAGNOSIS — S81.801A TRAUMATIC OPEN WOUND OF RIGHT LOWER LEG WITH INFECTION, INITIAL ENCOUNTER: ICD-10-CM

## 2024-07-15 DIAGNOSIS — S80.11XA CONTUSION OF RIGHT LOWER EXTREMITY, INITIAL ENCOUNTER: Primary | ICD-10-CM

## 2024-07-15 PROCEDURE — 3074F SYST BP LT 130 MM HG: CPT | Performed by: FAMILY MEDICINE

## 2024-07-15 PROCEDURE — 99213 OFFICE O/P EST LOW 20 MIN: CPT | Mod: 25 | Performed by: FAMILY MEDICINE

## 2024-07-15 PROCEDURE — 90715 TDAP VACCINE 7 YRS/> IM: CPT | Performed by: FAMILY MEDICINE

## 2024-07-15 PROCEDURE — 3079F DIAST BP 80-89 MM HG: CPT | Performed by: FAMILY MEDICINE

## 2024-07-15 PROCEDURE — 90471 IMMUNIZATION ADMIN: CPT | Performed by: FAMILY MEDICINE

## 2024-07-15 PROCEDURE — 73590 X-RAY EXAM OF LOWER LEG: CPT | Mod: TC,RT | Performed by: FAMILY MEDICINE

## 2024-07-15 RX ORDER — CEPHALEXIN 500 MG/1
1000 CAPSULE ORAL 2 TIMES DAILY
Qty: 20 CAPSULE | Refills: 0 | Status: SHIPPED | OUTPATIENT
Start: 2024-07-15 | End: 2024-07-20

## 2024-07-15 ASSESSMENT — FIBROSIS 4 INDEX: FIB4 SCORE: 0.42

## 2024-07-19 ENCOUNTER — HOSPITAL ENCOUNTER (EMERGENCY)
Facility: MEDICAL CENTER | Age: 34
End: 2024-07-19
Attending: EMERGENCY MEDICINE
Payer: COMMERCIAL

## 2024-07-19 ENCOUNTER — APPOINTMENT (OUTPATIENT)
Dept: RADIOLOGY | Facility: MEDICAL CENTER | Age: 34
End: 2024-07-19
Attending: EMERGENCY MEDICINE
Payer: COMMERCIAL

## 2024-07-19 VITALS
SYSTOLIC BLOOD PRESSURE: 130 MMHG | HEIGHT: 71 IN | WEIGHT: 220 LBS | DIASTOLIC BLOOD PRESSURE: 75 MMHG | BODY MASS INDEX: 30.8 KG/M2 | RESPIRATION RATE: 20 BRPM | HEART RATE: 75 BPM | OXYGEN SATURATION: 94 % | TEMPERATURE: 97.7 F

## 2024-07-19 DIAGNOSIS — M79.604 PAIN OF RIGHT LOWER EXTREMITY: ICD-10-CM

## 2024-07-19 PROCEDURE — 99284 EMERGENCY DEPT VISIT MOD MDM: CPT

## 2024-07-19 PROCEDURE — 93971 EXTREMITY STUDY: CPT | Mod: RT

## 2024-07-19 ASSESSMENT — PAIN DESCRIPTION - PAIN TYPE: TYPE: ACUTE PAIN

## 2024-07-19 ASSESSMENT — FIBROSIS 4 INDEX: FIB4 SCORE: 0.42

## 2024-09-02 DIAGNOSIS — F41.0 PANIC ATTACK: ICD-10-CM

## 2024-09-03 RX ORDER — PROPRANOLOL HCL 10 MG
10 TABLET ORAL 3 TIMES DAILY PRN
Qty: 90 TABLET | Refills: 0 | Status: SHIPPED | OUTPATIENT
Start: 2024-09-03

## 2024-10-04 DIAGNOSIS — F41.0 PANIC ATTACK: ICD-10-CM

## 2024-10-04 RX ORDER — PROPRANOLOL HYDROCHLORIDE 10 MG/1
10 TABLET ORAL 3 TIMES DAILY PRN
Qty: 90 TABLET | Refills: 0 | Status: SHIPPED | OUTPATIENT
Start: 2024-10-04 | End: 2024-10-30

## 2024-10-29 DIAGNOSIS — F41.0 PANIC ATTACK: ICD-10-CM

## 2024-10-30 RX ORDER — PROPRANOLOL HYDROCHLORIDE 10 MG/1
10 TABLET ORAL 3 TIMES DAILY PRN
Qty: 270 TABLET | Refills: 1 | Status: SHIPPED | OUTPATIENT
Start: 2024-10-30

## 2025-02-19 ENCOUNTER — HOSPITAL ENCOUNTER (OUTPATIENT)
Facility: MEDICAL CENTER | Age: 35
End: 2025-02-19
Attending: STUDENT IN AN ORGANIZED HEALTH CARE EDUCATION/TRAINING PROGRAM
Payer: COMMERCIAL

## 2025-02-19 ENCOUNTER — OFFICE VISIT (OUTPATIENT)
Dept: MEDICAL GROUP | Age: 35
End: 2025-02-19
Payer: COMMERCIAL

## 2025-02-19 VITALS
OXYGEN SATURATION: 96 % | DIASTOLIC BLOOD PRESSURE: 86 MMHG | HEIGHT: 71 IN | BODY MASS INDEX: 31.92 KG/M2 | HEART RATE: 84 BPM | SYSTOLIC BLOOD PRESSURE: 116 MMHG | TEMPERATURE: 97.9 F | WEIGHT: 228 LBS

## 2025-02-19 DIAGNOSIS — Z79.899 CHRONIC USE OF BENZODIAZEPINE FOR THERAPEUTIC PURPOSE: Chronic | ICD-10-CM

## 2025-02-19 DIAGNOSIS — F41.8 SITUATIONAL ANXIETY: ICD-10-CM

## 2025-02-19 DIAGNOSIS — F41.0 PANIC ATTACKS: Chronic | ICD-10-CM

## 2025-02-19 PROCEDURE — 3079F DIAST BP 80-89 MM HG: CPT | Performed by: STUDENT IN AN ORGANIZED HEALTH CARE EDUCATION/TRAINING PROGRAM

## 2025-02-19 PROCEDURE — 3074F SYST BP LT 130 MM HG: CPT | Performed by: STUDENT IN AN ORGANIZED HEALTH CARE EDUCATION/TRAINING PROGRAM

## 2025-02-19 PROCEDURE — 80307 DRUG TEST PRSMV CHEM ANLYZR: CPT

## 2025-02-19 PROCEDURE — 99214 OFFICE O/P EST MOD 30 MIN: CPT | Performed by: STUDENT IN AN ORGANIZED HEALTH CARE EDUCATION/TRAINING PROGRAM

## 2025-02-19 RX ORDER — HYDROXYZINE HYDROCHLORIDE 25 MG/1
25 TABLET, FILM COATED ORAL 3 TIMES DAILY PRN
Qty: 30 TABLET | Refills: 0 | Status: SHIPPED | OUTPATIENT
Start: 2025-02-19

## 2025-02-19 RX ORDER — ALPRAZOLAM 0.5 MG
0.5 TABLET ORAL
COMMUNITY
Start: 2020-11-08 | End: 2025-02-19 | Stop reason: SDUPTHER

## 2025-02-19 RX ORDER — ALPRAZOLAM 0.5 MG
0.5 TABLET ORAL 3 TIMES DAILY PRN
Qty: 7 TABLET | Refills: 0 | Status: SHIPPED | OUTPATIENT
Start: 2025-02-19 | End: 2025-02-26

## 2025-02-19 ASSESSMENT — FIBROSIS 4 INDEX: FIB4 SCORE: 0.44

## 2025-02-19 ASSESSMENT — PATIENT HEALTH QUESTIONNAIRE - PHQ9: CLINICAL INTERPRETATION OF PHQ2 SCORE: 0

## 2025-02-19 NOTE — PROGRESS NOTES
"Verbal consent was acquired by the patient to use [employer] ambient listening note generation during this visit     Subjective:     HPI:   History of Present Illness  The patient is a 34-year-old male who is here for a positive follow-up.    He has been utilizing alprazolam as a controlled substance since 2020, with the last refill obtained in August 2024. He acknowledges the requirement of an annual urine test due to the controlled nature of the medication. His primary care physician, Dr. Loza, has previously refilled his prescription on several occasions. He has undergone urine drug screens in the past. He has a scheduled trip on 02/28/2025 and possesses a few remaining pills from his previous prescription. He expresses willingness to postpone the refill until the following month if necessary. He typically receives a 30-day supply, which lasts him approximately 6 months, depending on his travel frequency. He reports experiencing side effects when taking more than one dose during flights. He has been obtaining his prescriptions from [facility] in [place]. He was initially prescribed Xanax during his residence in [place] but transitioned to alprazolam upon relocation. Pt never tried hydroxyzine. He administers the medication sparingly, primarily for anxiety management during flights.    MEDICATIONS  Current: Alprazolam.  Past: Xanax.    No n/v/d/c, fever, chills, sob, chest pain      Objective:     Exam:  /86 (BP Location: Right arm, Patient Position: Sitting, BP Cuff Size: Adult)   Pulse 84   Temp 36.6 °C (97.9 °F) (Temporal)   Ht 1.803 m (5' 11\")   Wt 103 kg (228 lb)   SpO2 96%   BMI 31.80 kg/m²  Body mass index is 31.8 kg/m².    Physical Exam  Gen: nad  HENT: ncat, EOMI  Resp: ctabl  Cardiac: rrr, no m  GI: nt/nd  Neuro: no focal deficits, cn 2-12 intact throughout, sensation to light touch intact throughout  Psych: appropriate mood and affect        Results      Assessment & Plan:     1. " Situational anxiety  Controlled Substance Treatment Agreement    ALPRAZolam (XANAX) 0.5 MG Tab    hydrOXYzine HCl (ATARAX) 25 MG Tab      2. Chronic use of benzodiazepine for therapeutic purpose  URINE DRUG SCREEN    ALPRAZolam (XANAX) 0.5 MG Tab    hydrOXYzine HCl (ATARAX) 25 MG Tab      3. Panic attacks  ALPRAZolam (XANAX) 0.5 MG Tab    hydrOXYzine HCl (ATARAX) 25 MG Tab          Assessment & Plan  1. Anxiety.  2. Panic attacks  3. Chronic use of benzo's  Stable.  He uses alprazolam 0.5 mg sparingly for anxiety attacks, particularly during flights.  He is advised to schedule an appointment with his primary care physician for further management and to complete his annual examination. A prescription for 7 tablets of alprazolam 0.5 mg will be provided to cover his upcoming trip. Will try hydroxyzine to possibly stop taking benzo's. I advised to try increasing the dose of propranolol to 20 mg prior to flights.   I was not able to see prior UDS results in the system. PDMP did not show history of prior refills (?system issues). PCP refilled alprazolam on multiple occasions in the past. Notes reviewed.   UDS and new CS agreement signed today.   I counseled patient on adverse effects of benzos and hydroxyzine.              Return in about 2 months (around 4/19/2025) for annual visit.    Please note that this dictation was created using voice recognition software. I have made every reasonable attempt to correct obvious errors, but I expect that there are errors of grammar and possibly content that I did not discover before finalizing the note.

## 2025-02-21 LAB
AMPHET CTO UR CFM-MCNC: NEGATIVE NG/ML
BARBITURATES CTO UR CFM-MCNC: NEGATIVE NG/ML
BENZODIAZ CTO UR CFM-MCNC: NEGATIVE NG/ML
CANNABINOIDS CTO UR CFM-MCNC: NEGATIVE NG/ML
COCAINE CTO UR CFM-MCNC: NEGATIVE NG/ML
CREAT UR-MCNC: 175.3 MG/DL (ref 20–400)
DRUG COMMENT 753798: NORMAL
METHADONE CTO UR CFM-MCNC: NEGATIVE NG/ML
OPIATES CTO UR CFM-MCNC: NEGATIVE NG/ML
PCP CTO UR CFM-MCNC: NEGATIVE NG/ML
PROPOXYPH CTO UR CFM-MCNC: NEGATIVE NG/ML

## 2025-02-24 ENCOUNTER — RESULTS FOLLOW-UP (OUTPATIENT)
Dept: MEDICAL GROUP | Age: 35
End: 2025-02-24

## 2025-04-23 ENCOUNTER — OFFICE VISIT (OUTPATIENT)
Dept: MEDICAL GROUP | Age: 35
End: 2025-04-23
Payer: COMMERCIAL

## 2025-04-23 VITALS
DIASTOLIC BLOOD PRESSURE: 80 MMHG | TEMPERATURE: 98.2 F | BODY MASS INDEX: 30.88 KG/M2 | OXYGEN SATURATION: 97 % | HEART RATE: 72 BPM | RESPIRATION RATE: 16 BRPM | WEIGHT: 228 LBS | HEIGHT: 72 IN | SYSTOLIC BLOOD PRESSURE: 120 MMHG

## 2025-04-23 DIAGNOSIS — E55.9 VITAMIN D DEFICIENCY: ICD-10-CM

## 2025-04-23 DIAGNOSIS — E78.5 DYSLIPIDEMIA: ICD-10-CM

## 2025-04-23 DIAGNOSIS — F41.8 SITUATIONAL ANXIETY: ICD-10-CM

## 2025-04-23 DIAGNOSIS — R53.83 OTHER FATIGUE: ICD-10-CM

## 2025-04-23 DIAGNOSIS — F41.0 PANIC ATTACKS: Chronic | ICD-10-CM

## 2025-04-23 DIAGNOSIS — Z79.899 CHRONIC USE OF BENZODIAZEPINE FOR THERAPEUTIC PURPOSE: Chronic | ICD-10-CM

## 2025-04-23 PROCEDURE — 99214 OFFICE O/P EST MOD 30 MIN: CPT | Performed by: INTERNAL MEDICINE

## 2025-04-23 PROCEDURE — 3074F SYST BP LT 130 MM HG: CPT | Performed by: INTERNAL MEDICINE

## 2025-04-23 PROCEDURE — 3079F DIAST BP 80-89 MM HG: CPT | Performed by: INTERNAL MEDICINE

## 2025-04-23 RX ORDER — ALPRAZOLAM 0.5 MG
0.5 TABLET ORAL NIGHTLY PRN
Qty: 30 TABLET | Refills: 0 | Status: SHIPPED | OUTPATIENT
Start: 2025-04-23 | End: 2025-05-23

## 2025-04-23 RX ORDER — ALPRAZOLAM 0.5 MG
0.5 TABLET ORAL NIGHTLY PRN
COMMUNITY

## 2025-04-23 ASSESSMENT — FIBROSIS 4 INDEX: FIB4 SCORE: 0.44

## 2025-04-23 NOTE — PROGRESS NOTES
CC:   Chief Complaint   Patient presents with    Medication Refill     Alprazolam      Diagnoses of Situational anxiety, Vitamin D deficiency, Dyslipidemia, Other fatigue, Panic attacks, and Chronic use of benzodiazepine for therapeutic purpose were pertinent to this visit.  Verbal consent was acquired by the patient to use [employer] ambient listening note generation during this visit Yes     History of Present Illness  Amparo is a pleasant 34 y.o. male with a history of situational anxiety, presenting for a medication follow-up. He was last seen on 03/21/2024. His condition remains stable. His last refill of alprazolam was on 02/19/2025. PDMP was reviewed today.    Anxiety has been effectively managed with alprazolam 0.5 mg, which he reports as being sufficient for his needs. The last refill lasted over 6 months due to limited travel at the end of the previous year. Anticipated increased travel in June 2025 necessitates a refill. Hydroxyzine was tried as recommended by Dr. Garcia, but it induced fatigue without significantly alleviating anxiety. Propranolol continues to be beneficial.    Lab work is due, including a testosterone check. Dietary habits have improved, with more salads and reduced heavy foods. Despite a busy schedule, some physical activity, such as walking, has been maintained. Beer has been abstained from for the past 3 months, and overall alcohol consumption has been reduced, favoring bourbon when drinking. Good oral hygiene is maintained, with regular brushing and occasional flossing. No hearing issues are reported. Regular visits to a dentist or dermatologist are not made.    SOCIAL HISTORY  He does not smoke.       Current Outpatient Medications Ordered in Epic   Medication Sig Dispense Refill    ALPRAZolam (XANAX) 0.5 MG Tab Take 1 Tablet by mouth at bedtime as needed for Anxiety for up to 30 days. 30 Tablet 0    ALPRAZolam (XANAX) 0.5 MG Tab Take 0.5 mg by mouth at bedtime as needed for Sleep.   "    propranolol (INDERAL) 10 MG Tab TAKE 1 TABLET BY MOUTH 3 TIMES A DAY AS NEEDED (PANIC ATTASKS/ANXIETY). 270 Tablet 1    hydrOXYzine HCl (ATARAX) 25 MG Tab Take 1 Tablet by mouth 3 times a day as needed for Anxiety. (Patient not taking: Reported on 4/23/2025) 30 Tablet 0     No current Epic-ordered facility-administered medications on file.     Review of Systems   All other systems reviewed and are negative.    Objective:     Exam:  /80 (BP Location: Left arm, Patient Position: Sitting, BP Cuff Size: Adult)   Pulse 72   Temp 36.8 °C (98.2 °F) (Temporal)   Resp 16   Ht 1.82 m (5' 11.65\")   Wt 103 kg (228 lb)   SpO2 97%   BMI 31.22 kg/m²  Body mass index is 31.22 kg/m².    Physical Exam  Constitutional:       Appearance: Normal appearance.   HENT:      Head: Normocephalic and atraumatic.   Cardiovascular:      Pulses: Normal pulses.      Heart sounds: Normal heart sounds.   Pulmonary:      Effort: Pulmonary effort is normal. No respiratory distress.      Breath sounds: Normal breath sounds.   Neurological:      Mental Status: He is alert and oriented to person, place, and time.   Psychiatric:         Mood and Affect: Mood normal.         Behavior: Behavior normal.         Thought Content: Thought content normal.         Judgment: Judgment normal.         Assessment & Plan:   Amparo  is a pleasant 34 y.o. male with the following -     Assessment & Plan  1. Situational anxiety.  - His condition remains stable with the current medication regimen.  - The controlled substance agreement was signed today. PDMP was reviewed, confirming the last refill on 02/19/2025.   - A prescription for alprazolam 0.5 mg, quantity 30 tablets, was provided.      2. History of dyslipidemia   - Improved dietary habits, incorporating more salads and reducing heavy foods. Abstained from beer for the past 3 months, favoring bourbon when he does drink.  - Despite a busy schedule, he engages in some physical activity, such as " walking.    Problem List Items Addressed This Visit       Chronic use of benzodiazepine for therapeutic purpose (Chronic)    Dyslipidemia (Chronic)    Relevant Orders    Lipid Profile    Comp Metabolic Panel    TSH WITH REFLEX TO FT4    Other fatigue    Relevant Orders    CBC WITH DIFFERENTIAL    TSH WITH REFLEX TO FT4    Testosterone, Free & Total, Adult Male (w/SHBG)    Panic attacks (Chronic)    Relevant Medications    ALPRAZolam (XANAX) 0.5 MG Tab    ALPRAZolam (XANAX) 0.5 MG Tab    Situational anxiety    Relevant Medications    ALPRAZolam (XANAX) 0.5 MG Tab    ALPRAZolam (XANAX) 0.5 MG Tab    Other Relevant Orders    Controlled Substance Treatment Agreement     Other Visit Diagnoses         Vitamin D deficiency        Relevant Orders    VITAMIN D,25 HYDROXY (DEFICIENCY)          Return if symptoms worsen or fail to improve, for follow up on lab results.    Please note that this dictation was created using voice recognition software. I have made every reasonable attempt to correct obvious errors, but I expect that there are errors of grammar and possibly content that I did not discover before finalizing the note.

## 2025-05-16 DIAGNOSIS — F41.0 PANIC ATTACK: ICD-10-CM

## 2025-05-16 RX ORDER — PROPRANOLOL HYDROCHLORIDE 10 MG/1
10 TABLET ORAL 3 TIMES DAILY PRN
Qty: 270 TABLET | Refills: 1 | Status: SHIPPED | OUTPATIENT
Start: 2025-05-16

## 2025-07-21 ENCOUNTER — PATIENT MESSAGE (OUTPATIENT)
Dept: MEDICAL GROUP | Age: 35
End: 2025-07-21
Payer: COMMERCIAL

## 2025-07-21 DIAGNOSIS — F41.0 PANIC ATTACK: Primary | ICD-10-CM

## 2025-07-21 RX ORDER — ALPRAZOLAM 0.5 MG
0.5 TABLET ORAL NIGHTLY PRN
Qty: 30 TABLET | Refills: 0 | Status: SHIPPED | OUTPATIENT
Start: 2025-07-21 | End: 2025-08-20

## 2025-07-21 NOTE — PATIENT COMMUNICATION
Received request via: Patient    Was the patient seen in the last year in this department? Yes    Does the patient have an active prescription (recently filled or refills available) for medication(s) requested? No    Pharmacy Name: [facility] #[id number] - [place], [place] - [address]     Does the patient have long term Plus and need 100-day supply? (This applies to ALL medications) Patient does not have SCP   Requested Prescriptions     Pending Prescriptions Disp Refills    ALPRAZolam (XANAX) 0.5 MG Tab 30 Tablet 0     Sig: Take 1 Tablet by mouth at bedtime as needed for Sleep.